# Patient Record
Sex: MALE | Race: WHITE | NOT HISPANIC OR LATINO | URBAN - NONMETROPOLITAN AREA
[De-identification: names, ages, dates, MRNs, and addresses within clinical notes are randomized per-mention and may not be internally consistent; named-entity substitution may affect disease eponyms.]

---
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
## 2023-03-20 ENCOUNTER — HOSPITAL ENCOUNTER (OUTPATIENT)
Dept: GENERAL RADIOLOGY | Facility: HOSPITAL | Age: 18
Discharge: HOME | End: 2023-03-20
Payer: COMMERCIAL

## 2023-03-20 ENCOUNTER — TRANSCRIBE ORDERS (OUTPATIENT)
Dept: ADMINISTRATIVE | Facility: HOSPITAL | Age: 18
End: 2023-03-20
Payer: COMMERCIAL

## 2023-03-20 DIAGNOSIS — M54.50 LOW BACK PAIN, UNSPECIFIED BACK PAIN LATERALITY, UNSPECIFIED CHRONICITY, UNSPECIFIED WHETHER SCIATICA PRESENT: Primary | ICD-10-CM

## 2023-03-20 DIAGNOSIS — M54.50 LOW BACK PAIN, UNSPECIFIED BACK PAIN LATERALITY, UNSPECIFIED CHRONICITY, UNSPECIFIED WHETHER SCIATICA PRESENT: ICD-10-CM

## 2023-03-20 PROCEDURE — 72110 X-RAY EXAM L-2 SPINE 4/>VWS: CPT | Performed by: RADIOLOGY

## 2023-03-20 PROCEDURE — 72110 X-RAY EXAM L-2 SPINE 4/>VWS: CPT

## 2024-04-14 ENCOUNTER — APPOINTMENT (OUTPATIENT)
Dept: CT IMAGING | Facility: HOSPITAL | Age: 19
End: 2024-04-14
Payer: COMMERCIAL

## 2024-04-14 ENCOUNTER — HOSPITAL ENCOUNTER (EMERGENCY)
Facility: HOSPITAL | Age: 19
Discharge: HOME | End: 2024-04-14
Attending: STUDENT IN AN ORGANIZED HEALTH CARE EDUCATION/TRAINING PROGRAM | Admitting: STUDENT IN AN ORGANIZED HEALTH CARE EDUCATION/TRAINING PROGRAM
Payer: COMMERCIAL

## 2024-04-14 VITALS
OXYGEN SATURATION: 98 % | SYSTOLIC BLOOD PRESSURE: 133 MMHG | WEIGHT: 260 LBS | RESPIRATION RATE: 14 BRPM | BODY MASS INDEX: 39.4 KG/M2 | DIASTOLIC BLOOD PRESSURE: 67 MMHG | HEART RATE: 71 BPM | HEIGHT: 68 IN | TEMPERATURE: 98.6 F

## 2024-04-14 DIAGNOSIS — N30.01 ACUTE CYSTITIS WITH HEMATURIA: Primary | ICD-10-CM

## 2024-04-14 LAB
ALBUMIN SERPL-MCNC: 4.5 G/DL (ref 3.5–5.2)
ALBUMIN/GLOB SERPL: 1.6 G/DL
ALP SERPL-CCNC: 60 U/L (ref 39–117)
ALT SERPL W P-5'-P-CCNC: 19 U/L (ref 1–41)
ANION GAP SERPL CALCULATED.3IONS-SCNC: 11.6 MMOL/L (ref 5–15)
APTT PPP: 27.3 SECONDS (ref 26.5–34.5)
AST SERPL-CCNC: 23 U/L (ref 1–40)
BACTERIA UR QL AUTO: ABNORMAL /HPF
BASOPHILS # BLD AUTO: 0.03 10*3/MM3 (ref 0–0.2)
BASOPHILS NFR BLD AUTO: 0.3 % (ref 0–1.5)
BILIRUB SERPL-MCNC: 0.4 MG/DL (ref 0–1.2)
BILIRUB UR QL STRIP: NEGATIVE
BUN SERPL-MCNC: 14 MG/DL (ref 6–20)
BUN/CREAT SERPL: 14.9 (ref 7–25)
C TRACH RRNA CVX QL NAA+PROBE: NOT DETECTED
CALCIUM SPEC-SCNC: 9.4 MG/DL (ref 8.6–10.5)
CHLORIDE SERPL-SCNC: 106 MMOL/L (ref 98–107)
CLARITY UR: ABNORMAL
CO2 SERPL-SCNC: 23.4 MMOL/L (ref 22–29)
COLOR UR: YELLOW
CREAT SERPL-MCNC: 0.94 MG/DL (ref 0.76–1.27)
CRP SERPL-MCNC: 1.33 MG/DL (ref 0–0.5)
DEPRECATED RDW RBC AUTO: 41.2 FL (ref 37–54)
EGFRCR SERPLBLD CKD-EPI 2021: 118.3 ML/MIN/1.73
EOSINOPHIL # BLD AUTO: 0.07 10*3/MM3 (ref 0–0.4)
EOSINOPHIL NFR BLD AUTO: 0.6 % (ref 0.3–6.2)
ERYTHROCYTE [DISTWIDTH] IN BLOOD BY AUTOMATED COUNT: 13.2 % (ref 12.3–15.4)
GLOBULIN UR ELPH-MCNC: 2.9 GM/DL
GLUCOSE SERPL-MCNC: 105 MG/DL (ref 65–99)
GLUCOSE UR STRIP-MCNC: NEGATIVE MG/DL
HCT VFR BLD AUTO: 44 % (ref 37.5–51)
HGB BLD-MCNC: 14.4 G/DL (ref 13–17.7)
HGB UR QL STRIP.AUTO: ABNORMAL
HOLD SPECIMEN: NORMAL
HOLD SPECIMEN: NORMAL
HYALINE CASTS UR QL AUTO: ABNORMAL /LPF
IMM GRANULOCYTES # BLD AUTO: 0.04 10*3/MM3 (ref 0–0.05)
IMM GRANULOCYTES NFR BLD AUTO: 0.3 % (ref 0–0.5)
INR PPP: 0.93 (ref 0.9–1.1)
KETONES UR QL STRIP: NEGATIVE
LEUKOCYTE ESTERASE UR QL STRIP.AUTO: ABNORMAL
LYMPHOCYTES # BLD AUTO: 2.11 10*3/MM3 (ref 0.7–3.1)
LYMPHOCYTES NFR BLD AUTO: 17.7 % (ref 19.6–45.3)
MAGNESIUM SERPL-MCNC: 2 MG/DL (ref 1.6–2.6)
MCH RBC QN AUTO: 28.2 PG (ref 26.6–33)
MCHC RBC AUTO-ENTMCNC: 32.7 G/DL (ref 31.5–35.7)
MCV RBC AUTO: 86.1 FL (ref 79–97)
MONOCYTES # BLD AUTO: 1.07 10*3/MM3 (ref 0.1–0.9)
MONOCYTES NFR BLD AUTO: 9 % (ref 5–12)
N GONORRHOEA RRNA SPEC QL NAA+PROBE: NOT DETECTED
NEUTROPHILS NFR BLD AUTO: 72.1 % (ref 42.7–76)
NEUTROPHILS NFR BLD AUTO: 8.6 10*3/MM3 (ref 1.7–7)
NITRITE UR QL STRIP: NEGATIVE
NRBC BLD AUTO-RTO: 0 /100 WBC (ref 0–0.2)
PH UR STRIP.AUTO: 6 [PH] (ref 5–8)
PLATELET # BLD AUTO: 275 10*3/MM3 (ref 140–450)
PMV BLD AUTO: 10.4 FL (ref 6–12)
POTASSIUM SERPL-SCNC: 4.7 MMOL/L (ref 3.5–5.2)
PROT SERPL-MCNC: 7.4 G/DL (ref 6–8.5)
PROT UR QL STRIP: ABNORMAL
PROTHROMBIN TIME: 13 SECONDS (ref 12.1–14.7)
RBC # BLD AUTO: 5.11 10*6/MM3 (ref 4.14–5.8)
RBC # UR STRIP: ABNORMAL /HPF
REF LAB TEST METHOD: ABNORMAL
SODIUM SERPL-SCNC: 141 MMOL/L (ref 136–145)
SP GR UR STRIP: 1.02 (ref 1–1.03)
SQUAMOUS #/AREA URNS HPF: ABNORMAL /HPF
TRICHOMONAS VAGINALIS PCR: NOT DETECTED
UROBILINOGEN UR QL STRIP: ABNORMAL
WBC # UR STRIP: ABNORMAL /HPF
WBC NRBC COR # BLD AUTO: 11.92 10*3/MM3 (ref 3.4–10.8)
WHOLE BLOOD HOLD COAG: NORMAL
WHOLE BLOOD HOLD SPECIMEN: NORMAL

## 2024-04-14 PROCEDURE — 74178 CT ABD&PLV WO CNTR FLWD CNTR: CPT

## 2024-04-14 PROCEDURE — 96365 THER/PROPH/DIAG IV INF INIT: CPT

## 2024-04-14 PROCEDURE — 85610 PROTHROMBIN TIME: CPT | Performed by: PHYSICIAN ASSISTANT

## 2024-04-14 PROCEDURE — 87086 URINE CULTURE/COLONY COUNT: CPT | Performed by: PHYSICIAN ASSISTANT

## 2024-04-14 PROCEDURE — 85730 THROMBOPLASTIN TIME PARTIAL: CPT | Performed by: PHYSICIAN ASSISTANT

## 2024-04-14 PROCEDURE — 85025 COMPLETE CBC W/AUTO DIFF WBC: CPT | Performed by: PHYSICIAN ASSISTANT

## 2024-04-14 PROCEDURE — 83735 ASSAY OF MAGNESIUM: CPT | Performed by: PHYSICIAN ASSISTANT

## 2024-04-14 PROCEDURE — 87491 CHLMYD TRACH DNA AMP PROBE: CPT | Performed by: PHYSICIAN ASSISTANT

## 2024-04-14 PROCEDURE — 25010000002 CEFTRIAXONE PER 250 MG: Performed by: PHYSICIAN ASSISTANT

## 2024-04-14 PROCEDURE — 25810000003 SODIUM CHLORIDE 0.9 % SOLUTION: Performed by: PHYSICIAN ASSISTANT

## 2024-04-14 PROCEDURE — 87591 N.GONORRHOEAE DNA AMP PROB: CPT | Performed by: PHYSICIAN ASSISTANT

## 2024-04-14 PROCEDURE — 86140 C-REACTIVE PROTEIN: CPT | Performed by: PHYSICIAN ASSISTANT

## 2024-04-14 PROCEDURE — 87661 TRICHOMONAS VAGINALIS AMPLIF: CPT | Performed by: PHYSICIAN ASSISTANT

## 2024-04-14 PROCEDURE — 81001 URINALYSIS AUTO W/SCOPE: CPT | Performed by: PHYSICIAN ASSISTANT

## 2024-04-14 PROCEDURE — 99285 EMERGENCY DEPT VISIT HI MDM: CPT

## 2024-04-14 PROCEDURE — 80053 COMPREHEN METABOLIC PANEL: CPT | Performed by: PHYSICIAN ASSISTANT

## 2024-04-14 PROCEDURE — 25510000001 IOPAMIDOL 61 % SOLUTION: Performed by: STUDENT IN AN ORGANIZED HEALTH CARE EDUCATION/TRAINING PROGRAM

## 2024-04-14 PROCEDURE — 74178 CT ABD&PLV WO CNTR FLWD CNTR: CPT | Performed by: RADIOLOGY

## 2024-04-14 RX ORDER — SODIUM CHLORIDE 0.9 % (FLUSH) 0.9 %
10 SYRINGE (ML) INJECTION AS NEEDED
Status: DISCONTINUED | OUTPATIENT
Start: 2024-04-14 | End: 2024-04-14 | Stop reason: HOSPADM

## 2024-04-14 RX ORDER — CEFDINIR 300 MG/1
300 CAPSULE ORAL 2 TIMES DAILY
Qty: 14 CAPSULE | Refills: 0 | Status: SHIPPED | OUTPATIENT
Start: 2024-04-14 | End: 2024-04-21

## 2024-04-14 RX ORDER — PHENAZOPYRIDINE HYDROCHLORIDE 200 MG/1
200 TABLET, FILM COATED ORAL 3 TIMES DAILY PRN
Qty: 6 TABLET | Refills: 0 | Status: SHIPPED | OUTPATIENT
Start: 2024-04-14

## 2024-04-14 RX ADMIN — SODIUM CHLORIDE 1000 ML: 9 INJECTION, SOLUTION INTRAVENOUS at 09:33

## 2024-04-14 RX ADMIN — SODIUM CHLORIDE 2000 MG: 9 INJECTION, SOLUTION INTRAVENOUS at 11:15

## 2024-04-14 RX ADMIN — IOPAMIDOL 80 ML: 612 INJECTION, SOLUTION INTRAVENOUS at 10:07

## 2024-04-14 NOTE — ED PROVIDER NOTES
Subjective   History of Present Illness  19-year-old male who presents to the ED today with his mother for hematuria.  He states he developed gross hematuria yesterday.  He is also had increased urinary frequency and urgency.  He denies any dysuria but states he has a lot of pressure and bladder spasm when he tries to urinate.  He denies any known fever but does report chills.  He denies any abdominal pain or back pain.  He states he has never been sexually active.  He denies any urethral discharge.  He reports being healthy and states that he does not smoke or vape.  He is not currently on any daily medications.    History provided by:  Patient  Blood in Urine  This is a new problem. The current episode started yesterday. The problem is unchanged. He describes the hematuria as gross hematuria. He is experiencing no pain. He describes his urine color as dark red. Irritative symptoms include frequency and urgency. Associated symptoms include chills. Pertinent negatives include no abdominal pain, bladder pain, dysuria, facial swelling, fever, flank pain, genital pain, hesitancy, inability to urinate, nausea, urinary retention, vomiting or weight loss. He is not sexually active.       Review of Systems   Constitutional:  Positive for chills. Negative for fever and weight loss.   HENT: Negative.  Negative for facial swelling.    Eyes: Negative.    Respiratory: Negative.     Cardiovascular: Negative.    Gastrointestinal: Negative.  Negative for abdominal pain, nausea and vomiting.   Genitourinary:  Positive for frequency, hematuria and urgency. Negative for dysuria, flank pain and hesitancy.   Musculoskeletal: Negative.    Skin: Negative.    Neurological: Negative.    Psychiatric/Behavioral: Negative.     All other systems reviewed and are negative.      No past medical history on file.    No Known Allergies    No past surgical history on file.    Family History   Problem Relation Age of Onset    Diabetes Mother      Hypertension Mother     Diabetes Maternal Grandfather     Hypertension Maternal Grandfather        Social History     Socioeconomic History    Marital status: Single   Tobacco Use    Smoking status: Never    Smokeless tobacco: Never   Substance and Sexual Activity    Alcohol use: No    Drug use: No    Sexual activity: Defer           Objective   Physical Exam  Vitals and nursing note reviewed.   Constitutional:       General: He is not in acute distress.     Appearance: Normal appearance. He is obese. He is not diaphoretic.   HENT:      Head: Normocephalic and atraumatic.      Right Ear: External ear normal.      Left Ear: External ear normal.      Nose: Nose normal.   Eyes:      Conjunctiva/sclera: Conjunctivae normal.      Pupils: Pupils are equal, round, and reactive to light.   Cardiovascular:      Rate and Rhythm: Normal rate and regular rhythm.      Heart sounds: Normal heart sounds.   Pulmonary:      Effort: Pulmonary effort is normal.      Breath sounds: Normal breath sounds.   Abdominal:      General: Bowel sounds are normal.      Palpations: Abdomen is soft.      Tenderness: There is no abdominal tenderness. There is no right CVA tenderness, left CVA tenderness, guarding or rebound.   Musculoskeletal:         General: Normal range of motion.      Cervical back: Normal range of motion and neck supple.   Skin:     General: Skin is warm and dry.      Capillary Refill: Capillary refill takes less than 2 seconds.   Neurological:      General: No focal deficit present.      Mental Status: He is alert and oriented to person, place, and time.   Psychiatric:         Mood and Affect: Mood normal.         Procedures       Results for orders placed or performed during the hospital encounter of 04/14/24   Chlamydia trachomatis, Neisseria gonorrhoeae, Trichomonas vaginalis, PCR - Urine, Urine, Clean Catch    Specimen: Urine, Clean Catch   Result Value Ref Range    Chlamydia DNA by PCR Not Detected Not Detected     Neisseria gonorrhoeae by PCR Not Detected Not Detected    Trichomonas vaginalis PCR Not Detected Not Detected, Invalid   Urinalysis With Culture If Indicated - Urine, Clean Catch    Specimen: Urine, Clean Catch   Result Value Ref Range    Color, UA Yellow Yellow, Straw    Appearance, UA Cloudy (A) Clear    pH, UA 6.0 5.0 - 8.0    Specific Gravity, UA 1.023 1.005 - 1.030    Glucose, UA Negative Negative    Ketones, UA Negative Negative    Bilirubin, UA Negative Negative    Blood, UA Large (3+) (A) Negative    Protein, UA >=300 mg/dL (3+) (A) Negative    Leuk Esterase, UA Moderate (2+) (A) Negative    Nitrite, UA Negative Negative    Urobilinogen, UA 1.0 E.U./dL 0.2 - 1.0 E.U./dL   Comprehensive Metabolic Panel    Specimen: Blood   Result Value Ref Range    Glucose 105 (H) 65 - 99 mg/dL    BUN 14 6 - 20 mg/dL    Creatinine 0.94 0.76 - 1.27 mg/dL    Sodium 141 136 - 145 mmol/L    Potassium 4.7 3.5 - 5.2 mmol/L    Chloride 106 98 - 107 mmol/L    CO2 23.4 22.0 - 29.0 mmol/L    Calcium 9.4 8.6 - 10.5 mg/dL    Total Protein 7.4 6.0 - 8.5 g/dL    Albumin 4.5 3.5 - 5.2 g/dL    ALT (SGPT) 19 1 - 41 U/L    AST (SGOT) 23 1 - 40 U/L    Alkaline Phosphatase 60 39 - 117 U/L    Total Bilirubin 0.4 0.0 - 1.2 mg/dL    Globulin 2.9 gm/dL    A/G Ratio 1.6 g/dL    BUN/Creatinine Ratio 14.9 7.0 - 25.0    Anion Gap 11.6 5.0 - 15.0 mmol/L    eGFR 118.3 >60.0 mL/min/1.73   Protime-INR    Specimen: Blood   Result Value Ref Range    Protime 13.0 12.1 - 14.7 Seconds    INR 0.93 0.90 - 1.10   aPTT    Specimen: Blood   Result Value Ref Range    PTT 27.3 26.5 - 34.5 seconds   C-reactive Protein    Specimen: Blood   Result Value Ref Range    C-Reactive Protein 1.33 (H) 0.00 - 0.50 mg/dL   Magnesium    Specimen: Blood   Result Value Ref Range    Magnesium 2.0 1.6 - 2.6 mg/dL   CBC Auto Differential    Specimen: Blood   Result Value Ref Range    WBC 11.92 (H) 3.40 - 10.80 10*3/mm3    RBC 5.11 4.14 - 5.80 10*6/mm3    Hemoglobin 14.4 13.0 - 17.7 g/dL     Hematocrit 44.0 37.5 - 51.0 %    MCV 86.1 79.0 - 97.0 fL    MCH 28.2 26.6 - 33.0 pg    MCHC 32.7 31.5 - 35.7 g/dL    RDW 13.2 12.3 - 15.4 %    RDW-SD 41.2 37.0 - 54.0 fl    MPV 10.4 6.0 - 12.0 fL    Platelets 275 140 - 450 10*3/mm3    Neutrophil % 72.1 42.7 - 76.0 %    Lymphocyte % 17.7 (L) 19.6 - 45.3 %    Monocyte % 9.0 5.0 - 12.0 %    Eosinophil % 0.6 0.3 - 6.2 %    Basophil % 0.3 0.0 - 1.5 %    Immature Grans % 0.3 0.0 - 0.5 %    Neutrophils, Absolute 8.60 (H) 1.70 - 7.00 10*3/mm3    Lymphocytes, Absolute 2.11 0.70 - 3.10 10*3/mm3    Monocytes, Absolute 1.07 (H) 0.10 - 0.90 10*3/mm3    Eosinophils, Absolute 0.07 0.00 - 0.40 10*3/mm3    Basophils, Absolute 0.03 0.00 - 0.20 10*3/mm3    Immature Grans, Absolute 0.04 0.00 - 0.05 10*3/mm3    nRBC 0.0 0.0 - 0.2 /100 WBC   Urinalysis, Microscopic Only - Urine, Clean Catch    Specimen: Urine, Clean Catch   Result Value Ref Range    RBC, UA Too Numerous to Count (A) None Seen, 0-2 /HPF    WBC, UA Too Numerous to Count (A) None Seen, 0-2 /HPF    Bacteria, UA None Seen None Seen /HPF    Squamous Epithelial Cells, UA None Seen None Seen, 0-2 /HPF    Hyaline Casts, UA None Seen None Seen /LPF    Methodology Automated Microscopy    Green Top (Gel)   Result Value Ref Range    Extra Tube Hold for add-ons.    Lavender Top   Result Value Ref Range    Extra Tube hold for add-on    Gold Top - SST   Result Value Ref Range    Extra Tube Hold for add-ons.    Light Blue Top   Result Value Ref Range    Extra Tube Hold for add-ons.           ED Course  ED Course as of 04/14/24 1143   Sun Apr 14, 2024   1051 Awaiting reading on CT scan []   1108 CT Abdomen Pelvis With & Without Contrast  IMPRESSION:     1. Abnormal thickening of the urinary bladder wall. Air within the  urinary bladder. Stranding around the urinary bladder.     2. Other findings as above   []      ED Course User Index  [] Jessica Ridley, PA                                             Medical Decision  Making  19-year-old male who presents to the ED today for gross hematuria that started yesterday.  He has urinary urgency and frequency.  He feels a lot of pressure when he has to urinate.  No evidence of urinary retention today.  Urinalysis did show too numerous to count red and white blood cells, no bacteria.  Urine has been sent off for culture.  Kidney function was within normal limits.  White blood cell count was 12,000.  CT of the abdomen and pelvis shows abnormal thickening of the urinary bladder wall and air within the urinary bladder with stranding around the urinary bladder.  He was given IV Rocephin in the ED.  He will be discharged on cefdinir and Pyridium.  He was advised to call the urology clinic tomorrow to schedule the next available appointment for follow-up.  He will return to the ED if symptoms change or worsen.    Problems Addressed:  Acute cystitis with hematuria: complicated acute illness or injury    Amount and/or Complexity of Data Reviewed  Labs: ordered.  Radiology: ordered. Decision-making details documented in ED Course.    Risk  Prescription drug management.        Final diagnoses:   Acute cystitis with hematuria       ED Disposition  ED Disposition       ED Disposition   Discharge    Condition   Stable    Comment   --               Kb Gay MD  [address]  [place] [address]  [phone]    Call in 1 day           Medication List        New Prescriptions      cefdinir 300 MG capsule  Commonly known as: OMNICEF  Take 1 capsule by mouth 2 (Two) Times a Day for 7 days.     phenazopyridine 200 MG tablet  Commonly known as: PYRIDIUM  Take 1 tablet by mouth 3 (Three) Times a Day As Needed for Bladder Spasms.               Where to Get Your Medications        These medications were sent to [id number] - [place] - [address] - [phone]  - [phone] FX  [address] [place] [address]      Phone:  [phone]   cefdinir 300 MG capsule  phenazopyridine 200 MG tablet            Jessica Ridley, PA  04/14/24 2718

## 2024-04-14 NOTE — DISCHARGE INSTRUCTIONS
Call the urology clinic tomorrow to schedule the next available appointment for follow-up.  A prescription for an antibiotic and Pyridium, which is to help with your bladder pain has been sent to your pharmacy.  You can start the Pyridium today.  Start the antibiotic tomorrow.  Make sure you are drinking plenty of fluids.  Return to the ED at any time if symptoms change or worsen.

## 2024-04-14 NOTE — Clinical Note
[facility] EMERGENCY DEPARTMENT  [address] [address]  Phone: [phone]    Elie Vazquez was seen and treated in our emergency department on 4/14/2024.  He may return to work on 04/16/2024.         Thank you for choosing [facility].    Jessica Ridley, PA

## 2024-04-15 LAB — BACTERIA SPEC AEROBE CULT: NO GROWTH

## 2024-04-22 ENCOUNTER — OFFICE VISIT (OUTPATIENT)
Dept: UROLOGY | Facility: CLINIC | Age: 19
End: 2024-04-22
Payer: COMMERCIAL

## 2024-04-22 VITALS
BODY MASS INDEX: 40.62 KG/M2 | HEART RATE: 89 BPM | HEIGHT: 68 IN | SYSTOLIC BLOOD PRESSURE: 136 MMHG | WEIGHT: 268 LBS | DIASTOLIC BLOOD PRESSURE: 87 MMHG

## 2024-04-22 DIAGNOSIS — R35.0 FREQUENCY OF MICTURITION: ICD-10-CM

## 2024-04-22 DIAGNOSIS — N30.91 CYSTITIS WITH HEMATURIA: Primary | ICD-10-CM

## 2024-04-22 LAB
BILIRUB BLD-MCNC: NEGATIVE MG/DL
CLARITY, POC: CLEAR
COLOR UR: YELLOW
EXPIRATION DATE: ABNORMAL
GLUCOSE UR STRIP-MCNC: NEGATIVE MG/DL
KETONES UR QL: NEGATIVE
LEUKOCYTE EST, POC: ABNORMAL
Lab: ABNORMAL
NITRITE UR-MCNC: NEGATIVE MG/ML
PH UR: 5 [PH] (ref 5–8)
PROT UR STRIP-MCNC: ABNORMAL MG/DL
RBC # UR STRIP: ABNORMAL /UL
SP GR UR: 1.03 (ref 1–1.03)
UROBILINOGEN UR QL: NORMAL

## 2024-04-22 PROCEDURE — 99203 OFFICE O/P NEW LOW 30 MIN: CPT

## 2024-04-22 PROCEDURE — 96372 THER/PROPH/DIAG INJ SC/IM: CPT

## 2024-04-22 PROCEDURE — 87086 URINE CULTURE/COLONY COUNT: CPT

## 2024-04-22 PROCEDURE — 81003 URINALYSIS AUTO W/O SCOPE: CPT

## 2024-04-22 RX ORDER — GENTAMICIN 40 MG/ML
80 INJECTION, SOLUTION INTRAMUSCULAR; INTRAVENOUS ONCE
Status: COMPLETED | OUTPATIENT
Start: 2024-04-22 | End: 2024-04-22

## 2024-04-22 RX ADMIN — GENTAMICIN 80 MG: 40 INJECTION, SOLUTION INTRAMUSCULAR; INTRAVENOUS at 09:23

## 2024-04-22 NOTE — PROGRESS NOTES
"Chief Complaint:    Chief Complaint   Patient presents with    Acute cystitis        Vital Signs:   /87   Pulse 89   Ht 172.7 cm (67.99\")   Wt 122 kg (268 lb)   BMI 40.76 kg/m²   Body mass index is 40.76 kg/m².      HPI:  Elie Vazquez is a 19 y.o. male who presents today for initial evaluation     History of Present Illness  Mr. Vazquez presents to the clinic for evaluation of acute cystitis with hematuria.  He has been referred to us by Dr. Rico Stallworth MD.  Patient states that he has had intermittent gross hematuria with passage of blood clots and fleshy like material over the past 10 to 14 days.  States he went to the emergency department on 4/14/2024 for evaluation.  He had a urine analysis completed at that time that showed 2+ leukocytes, 3+ protein, and 3+ blood.  Microscopic UA showed too numerous to count red blood cells and too numerous to count white blood cells.  No bacteria was seen on microscopic UA.  Patient's urine was sent off for culture at that time that showed no growth.  Patient's white blood cell count was slightly elevated at 11.9.  Patient's overall creatinine was great at 0.9 and GFR was 118.  Did have a CT scan of the abdomen and pelvis that showed abnormal thickening of the urinary bladder wall with air within the urinary bladder.  There was notable stranding around the urinary bladder but no significant signs of hydroureter, hydronephrosis, bladder mass, nephrolithiasis, or obvious fistula.  Patient denies any history of urological or urogenital procedures.  He denies any recent catheterizations as well.  He reports that he did pass blood in his urine at roughly 11 PM last night.  Urine in office today is yellow in color with no obvious signs of gross hematuria.  Urine analysis showed 2+ leukocytes, 2+ protein, and 3+ blood.  He denies any fever, chills, nausea, vomiting, back/flank pain, or inability urinate.  He does endorse some frequency, urgency, split stream, and difficulty " starting.  Physical exam today is unremarkable      Past Medical History:  No past medical history on file.    Current Meds:  Current Outpatient Medications   Medication Sig Dispense Refill    phenazopyridine (PYRIDIUM) 200 MG tablet Take 1 tablet by mouth 3 (Three) Times a Day As Needed for Bladder Spasms. (Patient not taking: Reported on 4/22/2024) 6 tablet 0     No current facility-administered medications for this visit.        Allergies:   No Known Allergies     Past Surgical History:  No past surgical history on file.    Social History:  Social History     Socioeconomic History    Marital status: Single   Tobacco Use    Smoking status: Never    Smokeless tobacco: Never   Vaping Use    Vaping status: Never Used   Substance and Sexual Activity    Alcohol use: No    Drug use: No    Sexual activity: Defer       Family History:  Family History   Problem Relation Age of Onset    Diabetes Mother     Hypertension Mother     Diabetes Maternal Grandfather     Hypertension Maternal Grandfather        Review of Systems:  Review of Systems   Constitutional:  Negative for fatigue, fever and unexpected weight change.   Respiratory:  Negative for chest tightness and shortness of breath.    Cardiovascular:  Negative for chest pain.   Gastrointestinal:  Negative for abdominal pain, constipation, diarrhea, nausea and vomiting.   Genitourinary:  Positive for difficulty urinating, dysuria, frequency, hematuria and urgency. Negative for flank pain, penile pain, penile swelling, scrotal swelling and testicular pain.   Skin:  Negative for rash.   Psychiatric/Behavioral:  Negative for confusion and suicidal ideas.        Physical Exam:  Physical Exam  Constitutional:       General: He is not in acute distress.     Appearance: Normal appearance.   HENT:      Head: Normocephalic and atraumatic.      Nose: Nose normal.      Mouth/Throat:      Mouth: Mucous membranes are moist.   Eyes:      Conjunctiva/sclera: Conjunctivae normal.    Cardiovascular:      Rate and Rhythm: Normal rate and regular rhythm.      Pulses: Normal pulses.      Heart sounds: Normal heart sounds.   Pulmonary:      Effort: Pulmonary effort is normal.      Breath sounds: Normal breath sounds.   Abdominal:      General: Bowel sounds are normal.      Palpations: Abdomen is soft.   Genitourinary:     Penis: Normal.       Testes: Normal.   Musculoskeletal:         General: Normal range of motion.      Cervical back: Normal range of motion.   Skin:     General: Skin is warm.   Neurological:      General: No focal deficit present.      Mental Status: He is alert and oriented to person, place, and time.   Psychiatric:         Mood and Affect: Mood normal.         Behavior: Behavior normal.         Thought Content: Thought content normal.         Judgment: Judgment normal.             Recent Image (CT and/or KUB):   CT Abdomen and Pelvis: Results for orders placed during the hospital encounter of 04/14/24    CT Abdomen Pelvis With & Without Contrast    Narrative  EXAM: CT ABDOMEN PELVIS W WO CONTRAST-      TECHNIQUE: Multiple axial CT images were obtained from lung bases  through pubic symphysis WITHOUT AND THEN AFTER THE administration of IV  contrast. Reformatted images in the coronal and/or sagittal plane(s)  were generated from the axial data set to facilitate diagnostic accuracy  and/or surgical planning.  Oral Contrast:NONE.    Radiation dose reduction techniques were utilized per ALARA protocol.  Automated exposure control was initiated through either or [employer] or  DoseRigPar8o software packages by  protocol.  DOSE:    Clinical information acute hematuria    Comparison 3/28/2021    FINDINGS:    Lower thorax: Clear. No effusions.    Abdomen:    Liver: Homogeneous. No focal hepatic mass or ductal dilatation.    Gallbladder: No dilation or stone identified.    Pancreas: Unremarkable. No mass or ductal dilatation.    Spleen: Homogeneous. No splenomegaly.    Adrenals:  No mass.    Kidneys/ureters: No mass. No obstructive uropathy.  No evidence of  urolithiasis.    GI tract: Moderate to large stool burden. There is no evidence of  appendicitis    MESENTERY: Stranding around the urinary bladder    Vasculature: No evidence of aneurysm.    Abdominal wall: No focal hernia or mass.      Bladder: Urinary bladder wall is thickened. There is air in the urinary  bladder and perivesicular stranding.    Reproductive: Unremarkable as visualized    Bones: No acute bony abnormality.    Impression  1. Abnormal thickening of the urinary bladder wall. Air within the  urinary bladder. Stranding around the urinary bladder.    2. Other findings as above              r    This report was finalized on 4/14/2024 11:05 AM by Dr. Ahmet Tao MD.     CT Stone Protocol: No results found for this or any previous visit.     KUB: No results found for this or any previous visit.       Labs:  Brief Urine Lab Results  (Last result in the past 365 days)        Color   Clarity   Blood   Leuk Est   Nitrite   Protein   CREAT   Urine HCG        04/22/24 0902 Yellow   Clear   3+   Moderate (2+)   Negative   2+                 Office Visit on 04/22/2024   Component Date Value Ref Range Status    Color 04/22/2024 Yellow  Yellow, Straw, Dark Yellow, Sharon Final    Clarity, UA 04/22/2024 Clear  Clear Final    Specific Gravity  04/22/2024 1.030  1.005 - 1.030 Final    pH, Urine 04/22/2024 5.0  5.0 - 8.0 Final    Leukocytes 04/22/2024 Moderate (2+) (A)  Negative Final    Nitrite, UA 04/22/2024 Negative  Negative Final    Protein, POC 04/22/2024 2+ (A)  Negative mg/dL Final    Glucose, UA 04/22/2024 Negative  Negative mg/dL Final    Ketones, UA 04/22/2024 Negative  Negative Final    Urobilinogen, UA 04/22/2024 Normal  Normal, 0.2 E.U./dL Final    Bilirubin 04/22/2024 Negative  Negative Final    Blood, UA 04/22/2024 3+ (A)  Negative Final    Lot Number 04/22/2024 [id number]   Final    Expiration Date 04/22/2024 10/25/2024    Final   Admission on 04/14/2024, Discharged on 04/14/2024   Component Date Value Ref Range Status    Color, UA 04/14/2024 Yellow  Yellow, Straw Final    Appearance, UA 04/14/2024 Cloudy (A)  Clear Final    pH, UA 04/14/2024 6.0  5.0 - 8.0 Final    Specific Gravity, UA 04/14/2024 1.023  1.005 - 1.030 Final    Glucose, UA 04/14/2024 Negative  Negative Final    Ketones, UA 04/14/2024 Negative  Negative Final    Bilirubin, UA 04/14/2024 Negative  Negative Final    Blood, UA 04/14/2024 Large (3+) (A)  Negative Final    Protein, UA 04/14/2024 >=300 mg/dL (3+) (A)  Negative Final    Leuk Esterase, UA 04/14/2024 Moderate (2+) (A)  Negative Final    Nitrite, UA 04/14/2024 Negative  Negative Final    Urobilinogen, UA 04/14/2024 1.0 E.U./dL  0.2 - 1.0 E.U./dL Final    Chlamydia DNA by PCR 04/14/2024 Not Detected  Not Detected Final    Neisseria gonorrhoeae by PCR 04/14/2024 Not Detected  Not Detected Final    Trichomonas vaginalis PCR 04/14/2024 Not Detected  Not Detected, Invalid Final    Glucose 04/14/2024 105 (H)  65 - 99 mg/dL Final    BUN 04/14/2024 14  6 - 20 mg/dL Final    Creatinine 04/14/2024 0.94  0.76 - 1.27 mg/dL Final    Sodium 04/14/2024 141  136 - 145 mmol/L Final    Potassium 04/14/2024 4.7  3.5 - 5.2 mmol/L Final    Specimen hemolyzed.  Result may be falsely elevated.    Chloride 04/14/2024 106  98 - 107 mmol/L Final    CO2 04/14/2024 23.4  22.0 - 29.0 mmol/L Final    Calcium 04/14/2024 9.4  8.6 - 10.5 mg/dL Final    Total Protein 04/14/2024 7.4  6.0 - 8.5 g/dL Final    Albumin 04/14/2024 4.5  3.5 - 5.2 g/dL Final    ALT (SGPT) 04/14/2024 19  1 - 41 U/L Final    Specimen hemolyzed.  Result may  be falsely elevated.    AST (SGOT) 04/14/2024 23  1 - 40 U/L Final    Alkaline Phosphatase 04/14/2024 60  39 - 117 U/L Final    Total Bilirubin 04/14/2024 0.4  0.0 - 1.2 mg/dL Final    Globulin 04/14/2024 2.9  gm/dL Final    A/G Ratio 04/14/2024 1.6  g/dL Final    BUN/Creatinine Ratio 04/14/2024 14.9  7.0 - 25.0 Final     Anion Gap 04/14/2024 11.6  5.0 - 15.0 mmol/L Final    eGFR 04/14/2024 118.3  >60.0 mL/min/1.73 Final    Protime 04/14/2024 13.0  12.1 - 14.7 Seconds Final    INR 04/14/2024 0.93  0.90 - 1.10 Final    PTT 04/14/2024 27.3  26.5 - 34.5 seconds Final    C-Reactive Protein 04/14/2024 1.33 (H)  0.00 - 0.50 mg/dL Final    Magnesium 04/14/2024 2.0  1.6 - 2.6 mg/dL Final    WBC 04/14/2024 11.92 (H)  3.40 - 10.80 10*3/mm3 Final    RBC 04/14/2024 5.11  4.14 - 5.80 10*6/mm3 Final    Hemoglobin 04/14/2024 14.4  13.0 - 17.7 g/dL Final    Hematocrit 04/14/2024 44.0  37.5 - 51.0 % Final    MCV 04/14/2024 86.1  79.0 - 97.0 fL Final    MCH 04/14/2024 28.2  26.6 - 33.0 pg Final    MCHC 04/14/2024 32.7  31.5 - 35.7 g/dL Final    RDW 04/14/2024 13.2  12.3 - 15.4 % Final    RDW-SD 04/14/2024 41.2  37.0 - 54.0 fl Final    MPV 04/14/2024 10.4  6.0 - 12.0 fL Final    Platelets 04/14/2024 275  140 - 450 10*3/mm3 Final    Neutrophil % 04/14/2024 72.1  42.7 - 76.0 % Final    Lymphocyte % 04/14/2024 17.7 (L)  19.6 - 45.3 % Final    Monocyte % 04/14/2024 9.0  5.0 - 12.0 % Final    Eosinophil % 04/14/2024 0.6  0.3 - 6.2 % Final    Basophil % 04/14/2024 0.3  0.0 - 1.5 % Final    Immature Grans % 04/14/2024 0.3  0.0 - 0.5 % Final    Neutrophils, Absolute 04/14/2024 8.60 (H)  1.70 - 7.00 10*3/mm3 Final    Lymphocytes, Absolute 04/14/2024 2.11  0.70 - 3.10 10*3/mm3 Final    Monocytes, Absolute 04/14/2024 1.07 (H)  0.10 - 0.90 10*3/mm3 Final    Eosinophils, Absolute 04/14/2024 0.07  0.00 - 0.40 10*3/mm3 Final    Basophils, Absolute 04/14/2024 0.03  0.00 - 0.20 10*3/mm3 Final    Immature Grans, Absolute 04/14/2024 0.04  0.00 - 0.05 10*3/mm3 Final    nRBC 04/14/2024 0.0  0.0 - 0.2 /100 WBC Final    Extra Tube 04/14/2024 Hold for add-ons.   Final    Auto resulted.    Extra Tube 04/14/2024 hold for add-on   Final    Auto resulted    Extra Tube 04/14/2024 Hold for add-ons.   Final    Auto resulted.    Extra Tube 04/14/2024 Hold for add-ons.   Final     Auto resulted    RBC, UA 04/14/2024 Too Numerous to Count (A)  None Seen, 0-2 /HPF Final    WBC, UA 04/14/2024 Too Numerous to Count (A)  None Seen, 0-2 /HPF Final    Bacteria, UA 04/14/2024 None Seen  None Seen /HPF Final    Squamous Epithelial Cells, UA 04/14/2024 None Seen  None Seen, 0-2 /HPF Final    Hyaline Casts, UA 04/14/2024 None Seen  None Seen /LPF Final    Methodology 04/14/2024 Automated Microscopy   Final    Urine Culture 04/14/2024 No growth   Final        Procedure: None  Procedures     I have reviewed and agree with the above PMH, PSH, FMH, social history, medications, allergies, and labs.     Assessment/Plan:   Problem List Items Addressed This Visit    None  Visit Diagnoses       Frequency of micturition    -  Primary    Relevant Medications    gentamicin (GARAMYCIN) injection 80 mg (Completed)    Other Relevant Orders    POC Urinalysis Dipstick, Automated (Completed)    Urine Culture - Urine, Urine, Clean Catch            Health Maintenance:   Health Maintenance Due   Topic Date Due    BMI FOLLOWUP  Never done    HPV VACCINES (1 - Male 3-dose series) Never done    HEPATITIS C SCREENING  Never done    ANNUAL PHYSICAL  Never done    TDAP/TD VACCINES (1 - Tdap) Never done    COVID-19 Vaccine (3 - 2023-24 season) 09/01/2023        Smoking Counseling: Never smoked or use smokeless tobacco    Urine Incontinence: Patient reports that he is not currently experiencing any symptoms of urinary incontinence.    Patient was given instructions and counseling regarding his condition or for health maintenance advice. Please see specific information pulled into the AVS if appropriate.    Patient Education:   Cystitis with hematuria and air within bladder -discussed with the patient the pathophysiology of this condition in detail.  I discussed causes which can include but not limited to acute infection, emphysematous cystitis, nephrolithiasis, prostatitis, fistula, trauma, instrumentations, and other urological  abnormalities.  Discussed an appropriate workup including a CT scan with and without contrast as well as a possible cystoscopy urogram.  Discussed both of these procedures in detail.  Patient's most recent CT scan with and without contrast showed no concerns of fistula.  Did discuss a cystoscopy urogram in detail with the patient.  Also discussed the use of an in office cystoscopy to identify any significant source or cause of cystitis and blood clots.  Discussed this procedure in detail including risk and benefits.  Given the patient's urine analysis still shows concerns of acute infection I will give the patient a gentamicin 80 mg shot in office today.  I will also send the patient's urine off for culture and call with results once available.  Patient does wish to proceed forward with an in office cystoscopy soon as possible.  Will get him scheduled this Wednesday.    Visit Diagnoses:    ICD-10-CM ICD-9-CM   1. Frequency of micturition  R35.0 788.41       Meds Ordered During Visit:  New Medications Ordered This Visit   Medications    gentamicin (GARAMYCIN) injection 80 mg       Follow Up Appointment: This Wednesday for an office cystoscopy.  No follow-ups on file.      This document has been electronically signed by Bjorn Anguiano PA-C   April 22, 2024 09:49 EDT    Part of this note may be an electronic transcription/translation of spoken language to printed text using the Dragon Dictation System.

## 2024-04-23 ENCOUNTER — TELEPHONE (OUTPATIENT)
Dept: UROLOGY | Facility: CLINIC | Age: 19
End: 2024-04-23
Payer: COMMERCIAL

## 2024-04-23 DIAGNOSIS — N30.91 CYSTITIS WITH HEMATURIA: Primary | ICD-10-CM

## 2024-04-23 LAB — BACTERIA SPEC AEROBE CULT: NO GROWTH

## 2024-04-23 RX ORDER — TAMSULOSIN HYDROCHLORIDE 0.4 MG/1
1 CAPSULE ORAL DAILY
Qty: 30 CAPSULE | Refills: 0 | Status: SHIPPED | OUTPATIENT
Start: 2024-04-23

## 2024-04-23 NOTE — TELEPHONE ENCOUNTER
Called patient advised him urine culture showed no significan infection.  Patient states gross hematuria continues to improve.  We will have him keep his follow-up tomorrow for an office cystoscopy if needed.

## 2024-04-23 NOTE — TELEPHONE ENCOUNTER
He said that we was suppose to have called in something for him but didn't. He wasn't sure what it was.

## 2024-04-24 ENCOUNTER — PROCEDURE VISIT (OUTPATIENT)
Dept: UROLOGY | Facility: CLINIC | Age: 19
End: 2024-04-24
Payer: COMMERCIAL

## 2024-04-24 VITALS
HEART RATE: 65 BPM | WEIGHT: 262.4 LBS | BODY MASS INDEX: 39.77 KG/M2 | HEIGHT: 68 IN | SYSTOLIC BLOOD PRESSURE: 150 MMHG | DIASTOLIC BLOOD PRESSURE: 79 MMHG

## 2024-04-24 DIAGNOSIS — N30.91 CYSTITIS WITH HEMATURIA: Primary | ICD-10-CM

## 2024-04-24 RX ORDER — GENTAMICIN 40 MG/ML
80 INJECTION, SOLUTION INTRAMUSCULAR; INTRAVENOUS ONCE
Status: COMPLETED | OUTPATIENT
Start: 2024-04-24 | End: 2024-04-24

## 2024-04-24 RX ADMIN — GENTAMICIN 80 MG: 40 INJECTION, SOLUTION INTRAMUSCULAR; INTRAVENOUS at 14:37

## 2024-04-24 NOTE — PROGRESS NOTES
"Chief Complaint:    Chief Complaint   Patient presents with    Cystoscopy    Cystitis with hematuria       Vital Signs:   /79   Pulse 65   Ht 172.7 cm (67.99\")   Wt 119 kg (262 lb 6.4 oz)   BMI 39.91 kg/m²   Body mass index is 39.91 kg/m².      HPI:  Elie Vazquez is a 19 y.o. male who presents today for follow up    History of Present Illness  Mr. Vazquez presents to the clinic for follow-up.  He was initially seen in the emergency department secondary to gross hematuria had a CT scan abdomen pelvis that showed air within the bladder as well as bladder wall thickening.  He did receive a shot of gentamicin at last office visit and his urine was sent off for culture however urine culture showed no growth.  States that since receiving antibiotic shot he has had some improvement in symptoms.  He denies any significant gross hematuria over the past 24 hours.  He does wish to proceed forward with an in office cystoscopy today.      Past Medical History:  History reviewed. No pertinent past medical history.    Current Meds:  Current Outpatient Medications   Medication Sig Dispense Refill    tamsulosin (FLOMAX) 0.4 MG capsule 24 hr capsule Take 1 capsule by mouth Daily. 30 capsule 0    nitrofurantoin, macrocrystal-monohydrate, (Macrobid) 100 MG capsule Take 1 capsule by mouth Every Night for 30 days. 30 capsule 0     No current facility-administered medications for this visit.        Allergies:   No Known Allergies     Past Surgical History:  History reviewed. No pertinent surgical history.    Social History:  Social History     Socioeconomic History    Marital status: Single   Tobacco Use    Smoking status: Never    Smokeless tobacco: Never   Vaping Use    Vaping status: Never Used   Substance and Sexual Activity    Alcohol use: No    Drug use: No    Sexual activity: Defer       Family History:  Family History   Problem Relation Age of Onset    Diabetes Mother     Hypertension Mother     Diabetes Maternal " Grandfather     Hypertension Maternal Grandfather        Review of Systems:  Review of Systems   Constitutional:  Negative for fatigue, fever and unexpected weight change.   Respiratory:  Negative for chest tightness and shortness of breath.    Cardiovascular:  Negative for chest pain.   Gastrointestinal:  Negative for abdominal pain, constipation, diarrhea, nausea and vomiting.   Genitourinary:  Positive for difficulty urinating, dysuria, frequency, hematuria and urgency. Negative for flank pain, penile pain, penile swelling, scrotal swelling and testicular pain.   Skin:  Negative for rash.   Psychiatric/Behavioral:  Negative for confusion and suicidal ideas.        Physical Exam:  Physical Exam  Constitutional:       General: He is not in acute distress.     Appearance: Normal appearance.   HENT:      Head: Normocephalic and atraumatic.      Nose: Nose normal.      Mouth/Throat:      Mouth: Mucous membranes are moist.   Eyes:      Conjunctiva/sclera: Conjunctivae normal.   Cardiovascular:      Rate and Rhythm: Normal rate and regular rhythm.      Pulses: Normal pulses.      Heart sounds: Normal heart sounds.   Pulmonary:      Effort: Pulmonary effort is normal.      Breath sounds: Normal breath sounds.   Abdominal:      General: Bowel sounds are normal.      Palpations: Abdomen is soft.   Musculoskeletal:         General: Normal range of motion.      Cervical back: Normal range of motion.   Skin:     General: Skin is warm.   Neurological:      General: No focal deficit present.      Mental Status: He is alert and oriented to person, place, and time.   Psychiatric:         Mood and Affect: Mood normal.         Behavior: Behavior normal.         Thought Content: Thought content normal.         Judgment: Judgment normal.           Recent Image (CT and/or KUB):   CT Abdomen and Pelvis: Results for orders placed during the hospital encounter of 04/14/24    CT Abdomen Pelvis With & Without Contrast    Narrative  EXAM:  CT ABDOMEN PELVIS W WO CONTRAST-      TECHNIQUE: Multiple axial CT images were obtained from lung bases  through pubic symphysis WITHOUT AND THEN AFTER THE administration of IV  contrast. Reformatted images in the coronal and/or sagittal plane(s)  were generated from the axial data set to facilitate diagnostic accuracy  and/or surgical planning.  Oral Contrast:NONE.    Radiation dose reduction techniques were utilized per ALARA protocol.  Automated exposure control was initiated through either or [employer] or  DoseRight software packages by  protocol.  DOSE:    Clinical information acute hematuria    Comparison 3/28/2021    FINDINGS:    Lower thorax: Clear. No effusions.    Abdomen:    Liver: Homogeneous. No focal hepatic mass or ductal dilatation.    Gallbladder: No dilation or stone identified.    Pancreas: Unremarkable. No mass or ductal dilatation.    Spleen: Homogeneous. No splenomegaly.    Adrenals: No mass.    Kidneys/ureters: No mass. No obstructive uropathy.  No evidence of  urolithiasis.    GI tract: Moderate to large stool burden. There is no evidence of  appendicitis    MESENTERY: Stranding around the urinary bladder    Vasculature: No evidence of aneurysm.    Abdominal wall: No focal hernia or mass.      Bladder: Urinary bladder wall is thickened. There is air in the urinary  bladder and perivesicular stranding.    Reproductive: Unremarkable as visualized    Bones: No acute bony abnormality.    Impression  1. Abnormal thickening of the urinary bladder wall. Air within the  urinary bladder. Stranding around the urinary bladder.    2. Other findings as above              r    This report was finalized on 4/14/2024 11:05 AM by Dr. Ahmet Tao MD.     CT Stone Protocol: No results found for this or any previous visit.     KUB: No results found for this or any previous visit.       Labs:  Brief Urine Lab Results  (Last result in the past 365 days)        Color   Clarity   Blood   Leuk Est    Nitrite   Protein   CREAT   Urine HCG        04/22/24 0902 Yellow   Clear   3+   Moderate (2+)   Negative   2+                 Office Visit on 04/22/2024   Component Date Value Ref Range Status    Color 04/22/2024 Yellow  Yellow, Straw, Dark Yellow, Sharon Final    Clarity, UA 04/22/2024 Clear  Clear Final    Specific Gravity  04/22/2024 1.030  1.005 - 1.030 Final    pH, Urine 04/22/2024 5.0  5.0 - 8.0 Final    Leukocytes 04/22/2024 Moderate (2+) (A)  Negative Final    Nitrite, UA 04/22/2024 Negative  Negative Final    Protein, POC 04/22/2024 2+ (A)  Negative mg/dL Final    Glucose, UA 04/22/2024 Negative  Negative mg/dL Final    Ketones, UA 04/22/2024 Negative  Negative Final    Urobilinogen, UA 04/22/2024 Normal  Normal, 0.2 E.U./dL Final    Bilirubin 04/22/2024 Negative  Negative Final    Blood, UA 04/22/2024 3+ (A)  Negative Final    Lot Number 04/22/2024 [id number]   Final    Expiration Date 04/22/2024 10/25/2024   Final    Urine Culture 04/22/2024 No growth   Final   Admission on 04/14/2024, Discharged on 04/14/2024   Component Date Value Ref Range Status    Color, UA 04/14/2024 Yellow  Yellow, Straw Final    Appearance, UA 04/14/2024 Cloudy (A)  Clear Final    pH, UA 04/14/2024 6.0  5.0 - 8.0 Final    Specific Gravity, UA 04/14/2024 1.023  1.005 - 1.030 Final    Glucose, UA 04/14/2024 Negative  Negative Final    Ketones, UA 04/14/2024 Negative  Negative Final    Bilirubin, UA 04/14/2024 Negative  Negative Final    Blood, UA 04/14/2024 Large (3+) (A)  Negative Final    Protein, UA 04/14/2024 >=300 mg/dL (3+) (A)  Negative Final    Leuk Esterase, UA 04/14/2024 Moderate (2+) (A)  Negative Final    Nitrite, UA 04/14/2024 Negative  Negative Final    Urobilinogen, UA 04/14/2024 1.0 E.U./dL  0.2 - 1.0 E.U./dL Final    Chlamydia DNA by PCR 04/14/2024 Not Detected  Not Detected Final    Neisseria gonorrhoeae by PCR 04/14/2024 Not Detected  Not Detected Final    Trichomonas vaginalis PCR 04/14/2024 Not Detected  Not  Detected, Invalid Final    Glucose 04/14/2024 105 (H)  65 - 99 mg/dL Final    BUN 04/14/2024 14  6 - 20 mg/dL Final    Creatinine 04/14/2024 0.94  0.76 - 1.27 mg/dL Final    Sodium 04/14/2024 141  136 - 145 mmol/L Final    Potassium 04/14/2024 4.7  3.5 - 5.2 mmol/L Final    Specimen hemolyzed.  Result may be falsely elevated.    Chloride 04/14/2024 106  98 - 107 mmol/L Final    CO2 04/14/2024 23.4  22.0 - 29.0 mmol/L Final    Calcium 04/14/2024 9.4  8.6 - 10.5 mg/dL Final    Total Protein 04/14/2024 7.4  6.0 - 8.5 g/dL Final    Albumin 04/14/2024 4.5  3.5 - 5.2 g/dL Final    ALT (SGPT) 04/14/2024 19  1 - 41 U/L Final    Specimen hemolyzed.  Result may  be falsely elevated.    AST (SGOT) 04/14/2024 23  1 - 40 U/L Final    Alkaline Phosphatase 04/14/2024 60  39 - 117 U/L Final    Total Bilirubin 04/14/2024 0.4  0.0 - 1.2 mg/dL Final    Globulin 04/14/2024 2.9  gm/dL Final    A/G Ratio 04/14/2024 1.6  g/dL Final    BUN/Creatinine Ratio 04/14/2024 14.9  7.0 - 25.0 Final    Anion Gap 04/14/2024 11.6  5.0 - 15.0 mmol/L Final    eGFR 04/14/2024 118.3  >60.0 mL/min/1.73 Final    Protime 04/14/2024 13.0  12.1 - 14.7 Seconds Final    INR 04/14/2024 0.93  0.90 - 1.10 Final    PTT 04/14/2024 27.3  26.5 - 34.5 seconds Final    C-Reactive Protein 04/14/2024 1.33 (H)  0.00 - 0.50 mg/dL Final    Magnesium 04/14/2024 2.0  1.6 - 2.6 mg/dL Final    WBC 04/14/2024 11.92 (H)  3.40 - 10.80 10*3/mm3 Final    RBC 04/14/2024 5.11  4.14 - 5.80 10*6/mm3 Final    Hemoglobin 04/14/2024 14.4  13.0 - 17.7 g/dL Final    Hematocrit 04/14/2024 44.0  37.5 - 51.0 % Final    MCV 04/14/2024 86.1  79.0 - 97.0 fL Final    MCH 04/14/2024 28.2  26.6 - 33.0 pg Final    MCHC 04/14/2024 32.7  31.5 - 35.7 g/dL Final    RDW 04/14/2024 13.2  12.3 - 15.4 % Final    RDW-SD 04/14/2024 41.2  37.0 - 54.0 fl Final    MPV 04/14/2024 10.4  6.0 - 12.0 fL Final    Platelets 04/14/2024 275  140 - 450 10*3/mm3 Final    Neutrophil % 04/14/2024 72.1  42.7 - 76.0 % Final     Lymphocyte % 04/14/2024 17.7 (L)  19.6 - 45.3 % Final    Monocyte % 04/14/2024 9.0  5.0 - 12.0 % Final    Eosinophil % 04/14/2024 0.6  0.3 - 6.2 % Final    Basophil % 04/14/2024 0.3  0.0 - 1.5 % Final    Immature Grans % 04/14/2024 0.3  0.0 - 0.5 % Final    Neutrophils, Absolute 04/14/2024 8.60 (H)  1.70 - 7.00 10*3/mm3 Final    Lymphocytes, Absolute 04/14/2024 2.11  0.70 - 3.10 10*3/mm3 Final    Monocytes, Absolute 04/14/2024 1.07 (H)  0.10 - 0.90 10*3/mm3 Final    Eosinophils, Absolute 04/14/2024 0.07  0.00 - 0.40 10*3/mm3 Final    Basophils, Absolute 04/14/2024 0.03  0.00 - 0.20 10*3/mm3 Final    Immature Grans, Absolute 04/14/2024 0.04  0.00 - 0.05 10*3/mm3 Final    nRBC 04/14/2024 0.0  0.0 - 0.2 /100 WBC Final    Extra Tube 04/14/2024 Hold for add-ons.   Final    Auto resulted.    Extra Tube 04/14/2024 hold for add-on   Final    Auto resulted    Extra Tube 04/14/2024 Hold for add-ons.   Final    Auto resulted.    Extra Tube 04/14/2024 Hold for add-ons.   Final    Auto resulted    RBC, UA 04/14/2024 Too Numerous to Count (A)  None Seen, 0-2 /HPF Final    WBC, UA 04/14/2024 Too Numerous to Count (A)  None Seen, 0-2 /HPF Final    Bacteria, UA 04/14/2024 None Seen  None Seen /HPF Final    Squamous Epithelial Cells, UA 04/14/2024 None Seen  None Seen, 0-2 /HPF Final    Hyaline Casts, UA 04/14/2024 None Seen  None Seen /LPF Final    Methodology 04/14/2024 Automated Microscopy   Final    Urine Culture 04/14/2024 No growth   Final        Procedure: Patient presents today for cystourethroscopy.  I went ahead and obtained an informed consent including the risks of anesthesia, bleeding, infection, etc.  After prepping and draping in a sterile fashion in the low dorsal lithotomy position, the urethra was gently anesthetized with 10 mL of 2% viscous Xylocaine jelly.  After an appropriate period of topical anesthesia, I used the Olympus digital 14 [place] flexible cystoscope to examine the anterior urethra which showed  significant inflammation and erythema consistent with urethritis.  Prostate was unremarkable.  Is into the patient's bladder he had erythema and inflammation consistent with cystitis.  I was also some mild sloughing and yellow tissue noted at the dome and left lateral bladder wall.  The ureteral orifices were visualized and normal in position and configuration. There were no stones, tumors, or foreign bodies.  The blue light was enabled and and showed no obvious mucosal lesions. The patient was given 80 mg of gentamicin in an intramuscular fashion as prophylaxis for the cystoscopy and released from the clinic.   Procedures     Assessment/Plan:   Problem List Items Addressed This Visit       Cystitis with hematuria - Primary    Relevant Medications    gentamicin (GARAMYCIN) injection 80 mg (Completed)    nitrofurantoin, macrocrystal-monohydrate, (Macrobid) 100 MG capsule       Health Maintenance:   Health Maintenance Due   Topic Date Due    BMI FOLLOWUP  Never done    HPV VACCINES (1 - Male 3-dose series) Never done    HEPATITIS C SCREENING  Never done    ANNUAL PHYSICAL  Never done    TDAP/TD VACCINES (1 - Tdap) Never done    COVID-19 Vaccine (3 - 2023-24 season) 09/01/2023        Smoking Counseling: Never smoked or use smokeless tobacco    Urine Incontinence: Patient reports that he is not currently experiencing any symptoms of urinary incontinence.    Patient was given instructions and counseling regarding his condition or for health maintenance advice. Please see specific information pulled into the AVS if appropriate.    Patient Education:   Cystitis with hematuria -patient's in office cystoscopy today revealed significant cystitis and urethra-itis as well as possible lipoma within the bladder.  Given current scope in office today I did give the patient a shot 80 mg gentamicin for prophylaxis.  I will also start the patient on Macrobid 100 mg once nightly to help with inflammation and acute cystitis.  Will also  send the patient's urine off for cytology results and call once available.  I did discuss with the patient cytology is negative and continues to pass tissue as well as blood we will proceed forward with a cystoscopy under general anesthetic with biopsy.  Otherwise we will see the patient back pending cytology report.  He verbalized understanding.    Visit Diagnoses:    ICD-10-CM ICD-9-CM   1. Cystitis with hematuria  N30.91 595.9       Meds Ordered During Visit:  New Medications Ordered This Visit   Medications    gentamicin (GARAMYCIN) injection 80 mg    nitrofurantoin, macrocrystal-monohydrate, (Macrobid) 100 MG capsule     Sig: Take 1 capsule by mouth Every Night for 30 days.     Dispense:  30 capsule     Refill:  0       Follow Up Appointment: Pending cytology report  No follow-ups on file.      This document has been electronically signed by Bjorn Anguiano PA-C   April 25, 2024 13:49 EDT    Part of this note may be an electronic transcription/translation of spoken language to printed text using the Dragon Dictation System.

## 2024-04-25 ENCOUNTER — LAB (OUTPATIENT)
Dept: UROLOGY | Facility: CLINIC | Age: 19
End: 2024-04-25
Payer: COMMERCIAL

## 2024-04-25 DIAGNOSIS — N30.91 CYSTITIS WITH HEMATURIA: ICD-10-CM

## 2024-04-25 DIAGNOSIS — R35.0 FREQUENCY OF MICTURITION: Primary | ICD-10-CM

## 2024-04-25 RX ORDER — NITROFURANTOIN 25; 75 MG/1; MG/1
100 CAPSULE ORAL NIGHTLY
Qty: 30 CAPSULE | Refills: 0 | Status: SHIPPED | OUTPATIENT
Start: 2024-04-25 | End: 2024-05-25

## 2024-04-26 ENCOUNTER — TELEPHONE (OUTPATIENT)
Dept: UROLOGY | Facility: CLINIC | Age: 19
End: 2024-04-26
Payer: COMMERCIAL

## 2024-04-26 NOTE — TELEPHONE ENCOUNTER
The pt called and said that he had taken 1 Macrobid pill at midnight and said that he was having strenuous breathing like he has ran a marathon and that he was having a high heart rate and wanted to know if this medication was dangerous. I told him this was one of the most gentle antibiotic that we prescribe and this is not a side effect. I asked the pt what his hear rate was and he said he had not checked it, that it just felt as if it was high. I told him that was more than likely heart palpitations. I told him to stop the medications and see if the symptoms correct themselves and if so it was the meds. If not then see his PCP.

## 2024-04-29 LAB — REF LAB TEST METHOD: NORMAL

## 2024-04-30 ENCOUNTER — TELEPHONE (OUTPATIENT)
Dept: UROLOGY | Facility: CLINIC | Age: 19
End: 2024-04-30
Payer: COMMERCIAL

## 2024-04-30 DIAGNOSIS — N30.91 CYSTITIS WITH HEMATURIA: Primary | ICD-10-CM

## 2024-04-30 RX ORDER — TRIMETHOPRIM 100 MG/1
100 TABLET ORAL DAILY
Qty: 30 TABLET | Refills: 0 | Status: SHIPPED | OUTPATIENT
Start: 2024-04-30

## 2024-04-30 NOTE — TELEPHONE ENCOUNTER
Called patient about cytology results and advised him that they showed no concerns of malignant cells.  Was anti-inflammatory cells present though.  Patient did discuss concerns of reactions to Macrobid.  Will discontinue this and recommend to continue with a nightly trimethoprim to help with inflammation and cystitis.  Will have him keep his follow-up on the 28th.  Patient verbalized understanding.

## 2024-05-28 ENCOUNTER — TELEPHONE (OUTPATIENT)
Dept: UROLOGY | Facility: CLINIC | Age: 19
End: 2024-05-28
Payer: COMMERCIAL

## 2024-05-28 ENCOUNTER — OFFICE VISIT (OUTPATIENT)
Dept: UROLOGY | Facility: CLINIC | Age: 19
End: 2024-05-28
Payer: COMMERCIAL

## 2024-05-28 VITALS
DIASTOLIC BLOOD PRESSURE: 76 MMHG | BODY MASS INDEX: 40.5 KG/M2 | HEIGHT: 68 IN | HEART RATE: 63 BPM | SYSTOLIC BLOOD PRESSURE: 132 MMHG | WEIGHT: 267.2 LBS

## 2024-05-28 DIAGNOSIS — N30.91 CYSTITIS WITH HEMATURIA: Primary | ICD-10-CM

## 2024-05-28 PROCEDURE — 99213 OFFICE O/P EST LOW 20 MIN: CPT

## 2024-05-28 NOTE — PROGRESS NOTES
"Chief Complaint:    Chief Complaint   Patient presents with    Cystitis with hematuria     1 month follow up       Vital Signs:   /76 (BP Location: Left arm, Patient Position: Sitting, Cuff Size: Adult)   Pulse 63   Ht 172.7 cm (67.99\")   Wt 121 kg (267 lb 3.2 oz)   BMI 40.64 kg/m²   Body mass index is 40.64 kg/m².      HPI:  Elie Vazquez is a 19 y.o. male who presents today for follow up    History of Present Illness  Mr. Vazquez presents to the clinic for follow-up for cystitis with hematuria.  He was initially evaluated after an ER visit where he was having passage of blood clots and urethral tissues.  He had a CT scan abdomen pelvis completed at that time that was unremarkable other than some bladder wall thickening and air within the bladder.  He underwent an office cystoscopy with me that showed significant cystitis and inflammation throughout the bladder wall with some notable friable tissue.  No active bleeding was noted on cystoscopy.  Patient has had 2 negative urine cultures previously.  He has been on Flomax and trimethoprim 100 mg nightly for cystitis and lower urinary tract symptoms.  He reports that trimethoprim did improve symptoms at first but he still has continue with passage of \"bladder tissue\" intermittently.  He reports his last passage of bladder tissue was roughly 3 to 4 days ago.  He denies any gross hematuria.  He has had a negative cytology as well.  He was unable to urinate in office.      Past Medical History:  History reviewed. No pertinent past medical history.    Current Meds:  Current Outpatient Medications   Medication Sig Dispense Refill    trimethoprim (TRIMPEX) 100 MG tablet Take 1 tablet by mouth Daily. 30 tablet 0    tamsulosin (FLOMAX) 0.4 MG capsule 24 hr capsule Take 1 capsule by mouth Daily. (Patient not taking: Reported on 5/28/2024) 30 capsule 0     No current facility-administered medications for this visit.        Allergies:   No Known Allergies     Past Surgical " History:  History reviewed. No pertinent surgical history.    Social History:  Social History     Socioeconomic History    Marital status: Single   Tobacco Use    Smoking status: Never    Smokeless tobacco: Never   Vaping Use    Vaping status: Never Used   Substance and Sexual Activity    Alcohol use: No    Drug use: No    Sexual activity: Defer       Family History:  Family History   Problem Relation Age of Onset    Diabetes Mother     Hypertension Mother     Diabetes Maternal Grandfather     Hypertension Maternal Grandfather        Review of Systems:  Review of Systems   Constitutional:  Negative for fatigue, fever and unexpected weight change.   Respiratory:  Negative for chest tightness and shortness of breath.    Cardiovascular:  Negative for chest pain.   Gastrointestinal:  Negative for abdominal pain, constipation, diarrhea, nausea and vomiting.   Genitourinary:  Positive for difficulty urinating, dysuria, frequency, hematuria and urgency. Negative for flank pain, penile pain, penile swelling, scrotal swelling and testicular pain.   Skin:  Negative for rash.   Psychiatric/Behavioral:  Negative for confusion and suicidal ideas.        Physical Exam:  Physical Exam  Constitutional:       General: He is not in acute distress.     Appearance: Normal appearance.   HENT:      Head: Normocephalic and atraumatic.      Nose: Nose normal.      Mouth/Throat:      Mouth: Mucous membranes are moist.   Eyes:      Conjunctiva/sclera: Conjunctivae normal.   Cardiovascular:      Rate and Rhythm: Normal rate and regular rhythm.      Pulses: Normal pulses.      Heart sounds: Normal heart sounds.   Pulmonary:      Effort: Pulmonary effort is normal.      Breath sounds: Normal breath sounds.   Abdominal:      General: Bowel sounds are normal.      Palpations: Abdomen is soft.   Genitourinary:     Penis: Normal.       Testes: Normal.   Musculoskeletal:         General: Normal range of motion.      Cervical back: Normal range of  motion.   Skin:     General: Skin is warm.   Neurological:      General: No focal deficit present.      Mental Status: He is alert and oriented to person, place, and time.   Psychiatric:         Mood and Affect: Mood normal.         Behavior: Behavior normal.         Thought Content: Thought content normal.         Judgment: Judgment normal.           Recent Image (CT and/or KUB):   CT Abdomen and Pelvis: Results for orders placed during the hospital encounter of 04/14/24    CT Abdomen Pelvis With & Without Contrast    Narrative  EXAM: CT ABDOMEN PELVIS W WO CONTRAST-      TECHNIQUE: Multiple axial CT images were obtained from lung bases  through pubic symphysis WITHOUT AND THEN AFTER THE administration of IV  contrast. Reformatted images in the coronal and/or sagittal plane(s)  were generated from the axial data set to facilitate diagnostic accuracy  and/or surgical planning.  Oral Contrast:NONE.    Radiation dose reduction techniques were utilized per ALARA protocol.  Automated exposure control was initiated through either or Exacaster or  DoseRight software packages by  protocol.  DOSE:    Clinical information acute hematuria    Comparison 3/28/2021    FINDINGS:    Lower thorax: Clear. No effusions.    Abdomen:    Liver: Homogeneous. No focal hepatic mass or ductal dilatation.    Gallbladder: No dilation or stone identified.    Pancreas: Unremarkable. No mass or ductal dilatation.    Spleen: Homogeneous. No splenomegaly.    Adrenals: No mass.    Kidneys/ureters: No mass. No obstructive uropathy.  No evidence of  urolithiasis.    GI tract: Moderate to large stool burden. There is no evidence of  appendicitis    MESENTERY: Stranding around the urinary bladder    Vasculature: No evidence of aneurysm.    Abdominal wall: No focal hernia or mass.      Bladder: Urinary bladder wall is thickened. There is air in the urinary  bladder and perivesicular stranding.    Reproductive: Unremarkable as  visualized    Bones: No acute bony abnormality.    Impression  1. Abnormal thickening of the urinary bladder wall. Air within the  urinary bladder. Stranding around the urinary bladder.    2. Other findings as above              r    This report was finalized on 2024 11:05 AM by Dr. Ahmet Tao MD.     CT Stone Protocol: No results found for this or any previous visit.     KUB: No results found for this or any previous visit.       Labs:  Brief Urine Lab Results  (Last result in the past 365 days)        Color   Clarity   Blood   Leuk Est   Nitrite   Protein   CREAT   Urine HCG        24 0902 Yellow   Clear   3+   Moderate (2+)   Negative   2+                 Lab on 2024   Component Date Value Ref Range Status    Reference Lab Report 2024    Final                    Value:[facility]  [address] [place] [address]  Phone: [phone] or [phone]  Fax: [phone]  Keenan Woods M.D., Medical Director    PATIENT NAME                                 LABORATORY NO.  KALEIGH GROVER                                [id number]  [id number]                                     AGE                SEX     SSN            CLIENT REF #  [facility]                          19       2005   M       [id number]    [id number]  [address]                                 REQUESTING M.D.       ATTENDING M.[place]..        [place] [address]                               Drain Calera  DATE COLLECTED        DATE RECEIVED          DATE REPORTED  2024    DIAGNOSIS:  A.      URINE, VOIDED:  Negative for malignant cells.  B.      URINE, VOIDED:  Negative for malignant cells.    MICROSCOPIC DESCRIPTION:  A.     Scant benign urothelial cells are present                           within a background of acute  inflammatory cells.  B.     Scant benign urothelial cells are present within a  background of acute  inflammatory cells.    Professional interpretation rendered by Anaya Hilliard D.O. at [facility], [place], [address] [place] [address].    CLINICAL HISTORY:  Cystitis with hematuria    SPECIMENS SUBMITTED:  A.    URINE, VOIDED  B.    URINE, VOIDED    GROSS SPECIMEN DESCRIPTION:  A.     35 ccs clear yellow fluid with no visible sediment received in fixative.  B.     50 ccs cloudy dark yellow fluid with scant visible sediment received in  fixative.    CYTOTECHNOLOGIST:         PATRICIA LANDEROS (ASCP)                       REVIEWED, DIAGNOSED AND ELECTRONICALLY  SIGNED BY:    Anaya Hilliard D.O.  CPT CODES:  88112x2     Office Visit on 04/22/2024   Component Date Value Ref Range Status    Color 04/22/2024 Yellow  Yellow, Straw, Dark Yellow, Sharon Final    Clarity, UA 04/22/2024 Clear  Clear Final    Specific Gravity  04/22/2024 1.030  1.005 - 1.030 Final    pH, Urine 04/22/2024 5.0  5.0 - 8.0 Final    Leukocytes 04/22/2024 Moderate (2+) (A)  Negative Final    Nitrite, UA 04/22/2024 Negative  Negative Final    Protein, POC 04/22/2024 2+ (A)  Negative mg/dL Final    Glucose, UA 04/22/2024 Negative  Negative mg/dL Final    Ketones, UA 04/22/2024 Negative  Negative Final    Urobilinogen, UA 04/22/2024 Normal  Normal, 0.2 E.U./dL Final    Bilirubin 04/22/2024 Negative  Negative Final    Blood, UA 04/22/2024 3+ (A)  Negative Final    Lot Number 04/22/2024 [id number]   Final    Expiration Date 04/22/2024 10/25/2024   Final    Urine Culture 04/22/2024 No growth   Final   Admission on 04/14/2024, Discharged on 04/14/2024   Component Date Value Ref Range Status    Color, UA 04/14/2024 Yellow  Yellow, Straw Final    Appearance, UA 04/14/2024 Cloudy (A)  Clear Final    pH, UA 04/14/2024 6.0  5.0 - 8.0 Final    Specific Gravity, UA 04/14/2024 1.023  1.005 - 1.030 Final    Glucose, UA 04/14/2024 Negative  Negative Final    Ketones, UA 04/14/2024 Negative  Negative Final    Bilirubin, UA 04/14/2024  Negative  Negative Final    Blood, UA 04/14/2024 Large (3+) (A)  Negative Final    Protein, UA 04/14/2024 >=300 mg/dL (3+) (A)  Negative Final    Leuk Esterase, UA 04/14/2024 Moderate (2+) (A)  Negative Final    Nitrite, UA 04/14/2024 Negative  Negative Final    Urobilinogen, UA 04/14/2024 1.0 E.U./dL  0.2 - 1.0 E.U./dL Final    Chlamydia DNA by PCR 04/14/2024 Not Detected  Not Detected Final    Neisseria gonorrhoeae by PCR 04/14/2024 Not Detected  Not Detected Final    Trichomonas vaginalis PCR 04/14/2024 Not Detected  Not Detected, Invalid Final    Glucose 04/14/2024 105 (H)  65 - 99 mg/dL Final    BUN 04/14/2024 14  6 - 20 mg/dL Final    Creatinine 04/14/2024 0.94  0.76 - 1.27 mg/dL Final    Sodium 04/14/2024 141  136 - 145 mmol/L Final    Potassium 04/14/2024 4.7  3.5 - 5.2 mmol/L Final    Specimen hemolyzed.  Result may be falsely elevated.    Chloride 04/14/2024 106  98 - 107 mmol/L Final    CO2 04/14/2024 23.4  22.0 - 29.0 mmol/L Final    Calcium 04/14/2024 9.4  8.6 - 10.5 mg/dL Final    Total Protein 04/14/2024 7.4  6.0 - 8.5 g/dL Final    Albumin 04/14/2024 4.5  3.5 - 5.2 g/dL Final    ALT (SGPT) 04/14/2024 19  1 - 41 U/L Final    Specimen hemolyzed.  Result may  be falsely elevated.    AST (SGOT) 04/14/2024 23  1 - 40 U/L Final    Alkaline Phosphatase 04/14/2024 60  39 - 117 U/L Final    Total Bilirubin 04/14/2024 0.4  0.0 - 1.2 mg/dL Final    Globulin 04/14/2024 2.9  gm/dL Final    A/G Ratio 04/14/2024 1.6  g/dL Final    BUN/Creatinine Ratio 04/14/2024 14.9  7.0 - 25.0 Final    Anion Gap 04/14/2024 11.6  5.0 - 15.0 mmol/L Final    eGFR 04/14/2024 118.3  >60.0 mL/min/1.73 Final    Protime 04/14/2024 13.0  12.1 - 14.7 Seconds Final    INR 04/14/2024 0.93  0.90 - 1.10 Final    PTT 04/14/2024 27.3  26.5 - 34.5 seconds Final    C-Reactive Protein 04/14/2024 1.33 (H)  0.00 - 0.50 mg/dL Final    Magnesium 04/14/2024 2.0  1.6 - 2.6 mg/dL Final    WBC 04/14/2024 11.92 (H)  3.40 - 10.80 10*3/mm3 Final    RBC  04/14/2024 5.11  4.14 - 5.80 10*6/mm3 Final    Hemoglobin 04/14/2024 14.4  13.0 - 17.7 g/dL Final    Hematocrit 04/14/2024 44.0  37.5 - 51.0 % Final    MCV 04/14/2024 86.1  79.0 - 97.0 fL Final    MCH 04/14/2024 28.2  26.6 - 33.0 pg Final    MCHC 04/14/2024 32.7  31.5 - 35.7 g/dL Final    RDW 04/14/2024 13.2  12.3 - 15.4 % Final    RDW-SD 04/14/2024 41.2  37.0 - 54.0 fl Final    MPV 04/14/2024 10.4  6.0 - 12.0 fL Final    Platelets 04/14/2024 275  140 - 450 10*3/mm3 Final    Neutrophil % 04/14/2024 72.1  42.7 - 76.0 % Final    Lymphocyte % 04/14/2024 17.7 (L)  19.6 - 45.3 % Final    Monocyte % 04/14/2024 9.0  5.0 - 12.0 % Final    Eosinophil % 04/14/2024 0.6  0.3 - 6.2 % Final    Basophil % 04/14/2024 0.3  0.0 - 1.5 % Final    Immature Grans % 04/14/2024 0.3  0.0 - 0.5 % Final    Neutrophils, Absolute 04/14/2024 8.60 (H)  1.70 - 7.00 10*3/mm3 Final    Lymphocytes, Absolute 04/14/2024 2.11  0.70 - 3.10 10*3/mm3 Final    Monocytes, Absolute 04/14/2024 1.07 (H)  0.10 - 0.90 10*3/mm3 Final    Eosinophils, Absolute 04/14/2024 0.07  0.00 - 0.40 10*3/mm3 Final    Basophils, Absolute 04/14/2024 0.03  0.00 - 0.20 10*3/mm3 Final    Immature Grans, Absolute 04/14/2024 0.04  0.00 - 0.05 10*3/mm3 Final    nRBC 04/14/2024 0.0  0.0 - 0.2 /100 WBC Final    Extra Tube 04/14/2024 Hold for add-ons.   Final    Auto resulted.    Extra Tube 04/14/2024 hold for add-on   Final    Auto resulted    Extra Tube 04/14/2024 Hold for add-ons.   Final    Auto resulted.    Extra Tube 04/14/2024 Hold for add-ons.   Final    Auto resulted    RBC, UA 04/14/2024 Too Numerous to Count (A)  None Seen, 0-2 /HPF Final    WBC, UA 04/14/2024 Too Numerous to Count (A)  None Seen, 0-2 /HPF Final    Bacteria, UA 04/14/2024 None Seen  None Seen /HPF Final    Squamous Epithelial Cells, UA 04/14/2024 None Seen  None Seen, 0-2 /HPF Final    Hyaline Casts, UA 04/14/2024 None Seen  None Seen /LPF Final    Methodology 04/14/2024 Automated Microscopy   Final    Urine  Culture 04/14/2024 No growth   Final        Procedure: None  Procedures     I have reviewed and agree with the above PMH, PSH, FMH, social history, medications, allergies, and labs.     Assessment/Plan:   Problem List Items Addressed This Visit       Cystitis with hematuria - Primary    Relevant Orders    Case Request (Completed)       Health Maintenance:   Health Maintenance Due   Topic Date Due    BMI FOLLOWUP  Never done    HPV VACCINES (1 - Male 3-dose series) Never done    HEPATITIS C SCREENING  Never done    ANNUAL PHYSICAL  Never done    TDAP/TD VACCINES (1 - Tdap) Never done    COVID-19 Vaccine (3 - 2023-24 season) 09/01/2023        Smoking Counseling: Never smoked or use smokeless tobacco    Urine Incontinence: Patient reports that he is not currently experiencing any symptoms of urinary incontinence.    Patient was given instructions and counseling regarding his condition or for health maintenance advice. Please see specific information pulled into the AVS if appropriate.    Patient Education:   Cystitis with hematuria and air within bladder -given patient's continuation of symptoms and recommended undergo a cystoscopy in the OR setting by Dr. Gay with possible biopsy when indicated.  Did discuss with the patient is [facility] recent cytology results that does confirm no significant malignancy.  Did discuss pathophysiology and causes of these conditions which can include but not limited to interstitial cystitis, chronic cystitis, bladder fistula, bladder diverticula, bladder tumors, lipomas, or other urological abnormalities.  Will schedule the patient for a cystoscopy on 6/19/2024.  Vies him return to clinic sooner if needed.  He verbalized understanding.    Visit Diagnoses:    ICD-10-CM ICD-9-CM   1. Cystitis with hematuria  N30.91 595.9       Meds Ordered During Visit:  No orders of the defined types were placed in this encounter.      Follow Up Appointment: Cystoscopy on 6/19/2024  No follow-ups on  file.      This document has been electronically signed by Bjorn Anguiano PA-C   May 28, 2024 10:46 EDT    Part of this note may be an electronic transcription/translation of spoken language to printed text using the Dragon Dictation System.

## 2024-05-28 NOTE — H&P (VIEW-ONLY)
"Chief Complaint:    Chief Complaint   Patient presents with    Cystitis with hematuria     1 month follow up       Vital Signs:   /76 (BP Location: Left arm, Patient Position: Sitting, Cuff Size: Adult)   Pulse 63   Ht 172.7 cm (67.99\")   Wt 121 kg (267 lb 3.2 oz)   BMI 40.64 kg/m²   Body mass index is 40.64 kg/m².      HPI:  Elie Vazquez is a 19 y.o. male who presents today for follow up    History of Present Illness  Mr. Vazquez presents to the clinic for follow-up for cystitis with hematuria.  He was initially evaluated after an ER visit where he was having passage of blood clots and urethral tissues.  He had a CT scan abdomen pelvis completed at that time that was unremarkable other than some bladder wall thickening and air within the bladder.  He underwent an office cystoscopy with me that showed significant cystitis and inflammation throughout the bladder wall with some notable friable tissue.  No active bleeding was noted on cystoscopy.  Patient has had 2 negative urine cultures previously.  He has been on Flomax and trimethoprim 100 mg nightly for cystitis and lower urinary tract symptoms.  He reports that trimethoprim did improve symptoms at first but he still has continue with passage of \"bladder tissue\" intermittently.  He reports his last passage of bladder tissue was roughly 3 to 4 days ago.  He denies any gross hematuria.  He has had a negative cytology as well.  He was unable to urinate in office.      Past Medical History:  History reviewed. No pertinent past medical history.    Current Meds:  Current Outpatient Medications   Medication Sig Dispense Refill    trimethoprim (TRIMPEX) 100 MG tablet Take 1 tablet by mouth Daily. 30 tablet 0    tamsulosin (FLOMAX) 0.4 MG capsule 24 hr capsule Take 1 capsule by mouth Daily. (Patient not taking: Reported on 5/28/2024) 30 capsule 0     No current facility-administered medications for this visit.        Allergies:   No Known Allergies     Past Surgical " History:  History reviewed. No pertinent surgical history.    Social History:  Social History     Socioeconomic History    Marital status: Single   Tobacco Use    Smoking status: Never    Smokeless tobacco: Never   Vaping Use    Vaping status: Never Used   Substance and Sexual Activity    Alcohol use: No    Drug use: No    Sexual activity: Defer       Family History:  Family History   Problem Relation Age of Onset    Diabetes Mother     Hypertension Mother     Diabetes Maternal Grandfather     Hypertension Maternal Grandfather        Review of Systems:  Review of Systems   Constitutional:  Negative for fatigue, fever and unexpected weight change.   Respiratory:  Negative for chest tightness and shortness of breath.    Cardiovascular:  Negative for chest pain.   Gastrointestinal:  Negative for abdominal pain, constipation, diarrhea, nausea and vomiting.   Genitourinary:  Positive for difficulty urinating, dysuria, frequency, hematuria and urgency. Negative for flank pain, penile pain, penile swelling, scrotal swelling and testicular pain.   Skin:  Negative for rash.   Psychiatric/Behavioral:  Negative for confusion and suicidal ideas.        Physical Exam:  Physical Exam  Constitutional:       General: He is not in acute distress.     Appearance: Normal appearance.   HENT:      Head: Normocephalic and atraumatic.      Nose: Nose normal.      Mouth/Throat:      Mouth: Mucous membranes are moist.   Eyes:      Conjunctiva/sclera: Conjunctivae normal.   Cardiovascular:      Rate and Rhythm: Normal rate and regular rhythm.      Pulses: Normal pulses.      Heart sounds: Normal heart sounds.   Pulmonary:      Effort: Pulmonary effort is normal.      Breath sounds: Normal breath sounds.   Abdominal:      General: Bowel sounds are normal.      Palpations: Abdomen is soft.   Genitourinary:     Penis: Normal.       Testes: Normal.   Musculoskeletal:         General: Normal range of motion.      Cervical back: Normal range of  motion.   Skin:     General: Skin is warm.   Neurological:      General: No focal deficit present.      Mental Status: He is alert and oriented to person, place, and time.   Psychiatric:         Mood and Affect: Mood normal.         Behavior: Behavior normal.         Thought Content: Thought content normal.         Judgment: Judgment normal.           Recent Image (CT and/or KUB):   CT Abdomen and Pelvis: Results for orders placed during the hospital encounter of 04/14/24    CT Abdomen Pelvis With & Without Contrast    Narrative  EXAM: CT ABDOMEN PELVIS W WO CONTRAST-      TECHNIQUE: Multiple axial CT images were obtained from lung bases  through pubic symphysis WITHOUT AND THEN AFTER THE administration of IV  contrast. Reformatted images in the coronal and/or sagittal plane(s)  were generated from the axial data set to facilitate diagnostic accuracy  and/or surgical planning.  Oral Contrast:NONE.    Radiation dose reduction techniques were utilized per ALARA protocol.  Automated exposure control was initiated through either or Unocoin or  DoseRight software packages by  protocol.  DOSE:    Clinical information acute hematuria    Comparison 3/28/2021    FINDINGS:    Lower thorax: Clear. No effusions.    Abdomen:    Liver: Homogeneous. No focal hepatic mass or ductal dilatation.    Gallbladder: No dilation or stone identified.    Pancreas: Unremarkable. No mass or ductal dilatation.    Spleen: Homogeneous. No splenomegaly.    Adrenals: No mass.    Kidneys/ureters: No mass. No obstructive uropathy.  No evidence of  urolithiasis.    GI tract: Moderate to large stool burden. There is no evidence of  appendicitis    MESENTERY: Stranding around the urinary bladder    Vasculature: No evidence of aneurysm.    Abdominal wall: No focal hernia or mass.      Bladder: Urinary bladder wall is thickened. There is air in the urinary  bladder and perivesicular stranding.    Reproductive: Unremarkable as  visualized    Bones: No acute bony abnormality.    Impression  1. Abnormal thickening of the urinary bladder wall. Air within the  urinary bladder. Stranding around the urinary bladder.    2. Other findings as above              r    This report was finalized on 2024 11:05 AM by Dr. Ahmet Tao MD.     CT Stone Protocol: No results found for this or any previous visit.     KUB: No results found for this or any previous visit.       Labs:  Brief Urine Lab Results  (Last result in the past 365 days)        Color   Clarity   Blood   Leuk Est   Nitrite   Protein   CREAT   Urine HCG        24 0902 Yellow   Clear   3+   Moderate (2+)   Negative   2+                 Lab on 2024   Component Date Value Ref Range Status    Reference Lab Report 2024    Final                    Value:[facility]  [address] [place] [address]  Phone: [phone] or [phone]  Fax: [phone]  Keenan Woods M.D., Medical Director    PATIENT NAME                                 LABORATORY NO.  KALEIGH GROVER                                [id number]  [id number]                                     AGE                SEX     SSN            CLIENT REF #  [facility]                          19       2005   M       [id number]    [id number]  [address]                                 REQUESTING M.D.       ATTENDING M.D..        [place] [address]                               [place]  DATE COLLECTED        DATE RECEIVED          DATE REPORTED  2024    DIAGNOSIS:  A.      URINE, VOIDED:  Negative for malignant cells.  B.      URINE, VOIDED:  Negative for malignant cells.    MICROSCOPIC DESCRIPTION:  A.     Scant benign urothelial cells are present                           within a background of acute  inflammatory cells.  B.     Scant benign urothelial cells are present within a  background of acute  inflammatory cells.    Professional interpretation rendered by Anaya Hilliard D.O. at [facility] [employer], [address] [place] [address].    CLINICAL HISTORY:  Cystitis with hematuria    SPECIMENS SUBMITTED:  A.    URINE, VOIDED  B.    URINE, VOIDED    GROSS SPECIMEN DESCRIPTION:  A.     35 ccs clear yellow fluid with no visible sediment received in fixative.  B.     50 ccs cloudy dark yellow fluid with scant visible sediment received in  fixative.    CYTOTECHNOLOGIST:         PATRICIA LANDEROS (ASCP)                       REVIEWED, DIAGNOSED AND ELECTRONICALLY  SIGNED BY:    Anaya Hilliard D.O.  CPT CODES:  88112x2     Office Visit on 04/22/2024   Component Date Value Ref Range Status    Color 04/22/2024 Yellow  Yellow, Straw, Dark Yellow, Sharon Final    Clarity, UA 04/22/2024 Clear  Clear Final    Specific Gravity  04/22/2024 1.030  1.005 - 1.030 Final    pH, Urine 04/22/2024 5.0  5.0 - 8.0 Final    Leukocytes 04/22/2024 Moderate (2+) (A)  Negative Final    Nitrite, UA 04/22/2024 Negative  Negative Final    Protein, POC 04/22/2024 2+ (A)  Negative mg/dL Final    Glucose, UA 04/22/2024 Negative  Negative mg/dL Final    Ketones, UA 04/22/2024 Negative  Negative Final    Urobilinogen, UA 04/22/2024 Normal  Normal, 0.2 E.U./dL Final    Bilirubin 04/22/2024 Negative  Negative Final    Blood, UA 04/22/2024 3+ (A)  Negative Final    Lot Number 04/22/2024 [id number]   Final    Expiration Date 04/22/2024 10/25/2024   Final    Urine Culture 04/22/2024 No growth   Final   Admission on 04/14/2024, Discharged on 04/14/2024   Component Date Value Ref Range Status    Color, UA 04/14/2024 Yellow  Yellow, Straw Final    Appearance, UA 04/14/2024 Cloudy (A)  Clear Final    pH, UA 04/14/2024 6.0  5.0 - 8.0 Final    Specific Gravity, UA 04/14/2024 1.023  1.005 - 1.030 Final    Glucose, UA 04/14/2024 Negative  Negative Final    Ketones, UA 04/14/2024 Negative  Negative Final    Bilirubin, UA 04/14/2024  Negative  Negative Final    Blood, UA 04/14/2024 Large (3+) (A)  Negative Final    Protein, UA 04/14/2024 >=300 mg/dL (3+) (A)  Negative Final    Leuk Esterase, UA 04/14/2024 Moderate (2+) (A)  Negative Final    Nitrite, UA 04/14/2024 Negative  Negative Final    Urobilinogen, UA 04/14/2024 1.0 E.U./dL  0.2 - 1.0 E.U./dL Final    Chlamydia DNA by PCR 04/14/2024 Not Detected  Not Detected Final    Neisseria gonorrhoeae by PCR 04/14/2024 Not Detected  Not Detected Final    Trichomonas vaginalis PCR 04/14/2024 Not Detected  Not Detected, Invalid Final    Glucose 04/14/2024 105 (H)  65 - 99 mg/dL Final    BUN 04/14/2024 14  6 - 20 mg/dL Final    Creatinine 04/14/2024 0.94  0.76 - 1.27 mg/dL Final    Sodium 04/14/2024 141  136 - 145 mmol/L Final    Potassium 04/14/2024 4.7  3.5 - 5.2 mmol/L Final    Specimen hemolyzed.  Result may be falsely elevated.    Chloride 04/14/2024 106  98 - 107 mmol/L Final    CO2 04/14/2024 23.4  22.0 - 29.0 mmol/L Final    Calcium 04/14/2024 9.4  8.6 - 10.5 mg/dL Final    Total Protein 04/14/2024 7.4  6.0 - 8.5 g/dL Final    Albumin 04/14/2024 4.5  3.5 - 5.2 g/dL Final    ALT (SGPT) 04/14/2024 19  1 - 41 U/L Final    Specimen hemolyzed.  Result may  be falsely elevated.    AST (SGOT) 04/14/2024 23  1 - 40 U/L Final    Alkaline Phosphatase 04/14/2024 60  39 - 117 U/L Final    Total Bilirubin 04/14/2024 0.4  0.0 - 1.2 mg/dL Final    Globulin 04/14/2024 2.9  gm/dL Final    A/G Ratio 04/14/2024 1.6  g/dL Final    BUN/Creatinine Ratio 04/14/2024 14.9  7.0 - 25.0 Final    Anion Gap 04/14/2024 11.6  5.0 - 15.0 mmol/L Final    eGFR 04/14/2024 118.3  >60.0 mL/min/1.73 Final    Protime 04/14/2024 13.0  12.1 - 14.7 Seconds Final    INR 04/14/2024 0.93  0.90 - 1.10 Final    PTT 04/14/2024 27.3  26.5 - 34.5 seconds Final    C-Reactive Protein 04/14/2024 1.33 (H)  0.00 - 0.50 mg/dL Final    Magnesium 04/14/2024 2.0  1.6 - 2.6 mg/dL Final    WBC 04/14/2024 11.92 (H)  3.40 - 10.80 10*3/mm3 Final    RBC  04/14/2024 5.11  4.14 - 5.80 10*6/mm3 Final    Hemoglobin 04/14/2024 14.4  13.0 - 17.7 g/dL Final    Hematocrit 04/14/2024 44.0  37.5 - 51.0 % Final    MCV 04/14/2024 86.1  79.0 - 97.0 fL Final    MCH 04/14/2024 28.2  26.6 - 33.0 pg Final    MCHC 04/14/2024 32.7  31.5 - 35.7 g/dL Final    RDW 04/14/2024 13.2  12.3 - 15.4 % Final    RDW-SD 04/14/2024 41.2  37.0 - 54.0 fl Final    MPV 04/14/2024 10.4  6.0 - 12.0 fL Final    Platelets 04/14/2024 275  140 - 450 10*3/mm3 Final    Neutrophil % 04/14/2024 72.1  42.7 - 76.0 % Final    Lymphocyte % 04/14/2024 17.7 (L)  19.6 - 45.3 % Final    Monocyte % 04/14/2024 9.0  5.0 - 12.0 % Final    Eosinophil % 04/14/2024 0.6  0.3 - 6.2 % Final    Basophil % 04/14/2024 0.3  0.0 - 1.5 % Final    Immature Grans % 04/14/2024 0.3  0.0 - 0.5 % Final    Neutrophils, Absolute 04/14/2024 8.60 (H)  1.70 - 7.00 10*3/mm3 Final    Lymphocytes, Absolute 04/14/2024 2.11  0.70 - 3.10 10*3/mm3 Final    Monocytes, Absolute 04/14/2024 1.07 (H)  0.10 - 0.90 10*3/mm3 Final    Eosinophils, Absolute 04/14/2024 0.07  0.00 - 0.40 10*3/mm3 Final    Basophils, Absolute 04/14/2024 0.03  0.00 - 0.20 10*3/mm3 Final    Immature Grans, Absolute 04/14/2024 0.04  0.00 - 0.05 10*3/mm3 Final    nRBC 04/14/2024 0.0  0.0 - 0.2 /100 WBC Final    Extra Tube 04/14/2024 Hold for add-ons.   Final    Auto resulted.    Extra Tube 04/14/2024 hold for add-on   Final    Auto resulted    Extra Tube 04/14/2024 Hold for add-ons.   Final    Auto resulted.    Extra Tube 04/14/2024 Hold for add-ons.   Final    Auto resulted    RBC, UA 04/14/2024 Too Numerous to Count (A)  None Seen, 0-2 /HPF Final    WBC, UA 04/14/2024 Too Numerous to Count (A)  None Seen, 0-2 /HPF Final    Bacteria, UA 04/14/2024 None Seen  None Seen /HPF Final    Squamous Epithelial Cells, UA 04/14/2024 None Seen  None Seen, 0-2 /HPF Final    Hyaline Casts, UA 04/14/2024 None Seen  None Seen /LPF Final    Methodology 04/14/2024 Automated Microscopy   Final    Urine  Culture 04/14/2024 No growth   Final        Procedure: None  Procedures     I have reviewed and agree with the above PMH, PSH, FMH, social history, medications, allergies, and labs.     Assessment/Plan:   Problem List Items Addressed This Visit       Cystitis with hematuria - Primary    Relevant Orders    Case Request (Completed)       Health Maintenance:   Health Maintenance Due   Topic Date Due    BMI FOLLOWUP  Never done    HPV VACCINES (1 - Male 3-dose series) Never done    HEPATITIS C SCREENING  Never done    ANNUAL PHYSICAL  Never done    TDAP/TD VACCINES (1 - Tdap) Never done    COVID-19 Vaccine (3 - 2023-24 season) 09/01/2023        Smoking Counseling: Never smoked or use smokeless tobacco    Urine Incontinence: Patient reports that he is not currently experiencing any symptoms of urinary incontinence.    Patient was given instructions and counseling regarding his condition or for health maintenance advice. Please see specific information pulled into the AVS if appropriate.    Patient Education:   Cystitis with hematuria and air within bladder -given patient's continuation of symptoms and recommended undergo a cystoscopy in the OR setting by Dr. Gay with possible biopsy when indicated.  Did discuss with the patient is [facility] recent cytology results that does confirm no significant malignancy.  Did discuss pathophysiology and causes of these conditions which can include but not limited to interstitial cystitis, chronic cystitis, bladder fistula, bladder diverticula, bladder tumors, lipomas, or other urological abnormalities.  Will schedule the patient for a cystoscopy on 6/19/2024.  Vies him return to clinic sooner if needed.  He verbalized understanding.    Visit Diagnoses:    ICD-10-CM ICD-9-CM   1. Cystitis with hematuria  N30.91 595.9       Meds Ordered During Visit:  No orders of the defined types were placed in this encounter.      Follow Up Appointment: Cystoscopy on 6/19/2024  No follow-ups on  file.      This document has been electronically signed by Bjorn Anguiano PA-C   May 28, 2024 10:46 EDT    Part of this note may be an electronic transcription/translation of spoken language to printed text using the Dragon Dictation System.

## 2024-06-17 ENCOUNTER — ANESTHESIA EVENT (OUTPATIENT)
Dept: PERIOP | Facility: HOSPITAL | Age: 19
End: 2024-06-17
Payer: COMMERCIAL

## 2024-06-19 ENCOUNTER — APPOINTMENT (OUTPATIENT)
Dept: GENERAL RADIOLOGY | Facility: HOSPITAL | Age: 19
End: 2024-06-19
Payer: COMMERCIAL

## 2024-06-19 ENCOUNTER — ANESTHESIA (OUTPATIENT)
Dept: PERIOP | Facility: HOSPITAL | Age: 19
End: 2024-06-19
Payer: COMMERCIAL

## 2024-06-19 ENCOUNTER — HOSPITAL ENCOUNTER (OUTPATIENT)
Facility: HOSPITAL | Age: 19
Setting detail: HOSPITAL OUTPATIENT SURGERY
Discharge: HOME | End: 2024-06-19
Attending: UROLOGY | Admitting: UROLOGY
Payer: COMMERCIAL

## 2024-06-19 VITALS
WEIGHT: 263 LBS | SYSTOLIC BLOOD PRESSURE: 134 MMHG | HEART RATE: 78 BPM | HEIGHT: 68 IN | BODY MASS INDEX: 39.86 KG/M2 | DIASTOLIC BLOOD PRESSURE: 75 MMHG | TEMPERATURE: 98 F | RESPIRATION RATE: 18 BRPM | OXYGEN SATURATION: 96 %

## 2024-06-19 DIAGNOSIS — N30.91 CYSTITIS WITH HEMATURIA: Primary | ICD-10-CM

## 2024-06-19 PROCEDURE — 25010000002 ONDANSETRON PER 1 MG: Performed by: NURSE ANESTHETIST, CERTIFIED REGISTERED

## 2024-06-19 PROCEDURE — 25010000002 PROPOFOL 200 MG/20ML EMULSION: Performed by: NURSE ANESTHETIST, CERTIFIED REGISTERED

## 2024-06-19 PROCEDURE — 25810000003 LACTATED RINGERS PER 1000 ML: Performed by: ANESTHESIOLOGY

## 2024-06-19 PROCEDURE — 25010000002 MEPERIDINE PER 100 MG: Performed by: NURSE ANESTHETIST, CERTIFIED REGISTERED

## 2024-06-19 PROCEDURE — 25010000002 FENTANYL CITRATE (PF) 50 MCG/ML SOLUTION: Performed by: NURSE ANESTHETIST, CERTIFIED REGISTERED

## 2024-06-19 PROCEDURE — 25010000002 HYDROMORPHONE PER 4 MG: Performed by: NURSE ANESTHETIST, CERTIFIED REGISTERED

## 2024-06-19 PROCEDURE — 25810000003 LACTATED RINGERS PER 1000 ML: Performed by: NURSE ANESTHETIST, CERTIFIED REGISTERED

## 2024-06-19 PROCEDURE — 52000 CYSTOURETHROSCOPY: CPT | Performed by: UROLOGY

## 2024-06-19 PROCEDURE — 25010000002 GENTAMICIN PER 80 MG: Performed by: UROLOGY

## 2024-06-19 PROCEDURE — 25010000002 MIDAZOLAM PER 1 MG: Performed by: NURSE ANESTHETIST, CERTIFIED REGISTERED

## 2024-06-19 RX ORDER — SODIUM CHLORIDE, SODIUM LACTATE, POTASSIUM CHLORIDE, CALCIUM CHLORIDE 600; 310; 30; 20 MG/100ML; MG/100ML; MG/100ML; MG/100ML
100 INJECTION, SOLUTION INTRAVENOUS ONCE AS NEEDED
Status: DISCONTINUED | OUTPATIENT
Start: 2024-06-19 | End: 2024-06-19 | Stop reason: HOSPADM

## 2024-06-19 RX ORDER — MIDAZOLAM HYDROCHLORIDE 1 MG/ML
1 INJECTION INTRAMUSCULAR; INTRAVENOUS
Status: DISCONTINUED | OUTPATIENT
Start: 2024-06-19 | End: 2024-06-19 | Stop reason: HOSPADM

## 2024-06-19 RX ORDER — SODIUM CHLORIDE, SODIUM LACTATE, POTASSIUM CHLORIDE, CALCIUM CHLORIDE 600; 310; 30; 20 MG/100ML; MG/100ML; MG/100ML; MG/100ML
125 INJECTION, SOLUTION INTRAVENOUS ONCE
Status: COMPLETED | OUTPATIENT
Start: 2024-06-19 | End: 2024-06-19

## 2024-06-19 RX ORDER — SODIUM CHLORIDE, SODIUM LACTATE, POTASSIUM CHLORIDE, CALCIUM CHLORIDE 600; 310; 30; 20 MG/100ML; MG/100ML; MG/100ML; MG/100ML
INJECTION, SOLUTION INTRAVENOUS CONTINUOUS PRN
Status: DISCONTINUED | OUTPATIENT
Start: 2024-06-19 | End: 2024-06-19 | Stop reason: SURG

## 2024-06-19 RX ORDER — LIDOCAINE HYDROCHLORIDE 20 MG/ML
JELLY TOPICAL AS NEEDED
Status: DISCONTINUED | OUTPATIENT
Start: 2024-06-19 | End: 2024-06-19 | Stop reason: HOSPADM

## 2024-06-19 RX ORDER — MEPERIDINE HYDROCHLORIDE 25 MG/ML
12.5 INJECTION INTRAMUSCULAR; INTRAVENOUS; SUBCUTANEOUS
Status: COMPLETED | OUTPATIENT
Start: 2024-06-19 | End: 2024-06-19

## 2024-06-19 RX ORDER — OXYCODONE HYDROCHLORIDE AND ACETAMINOPHEN 5; 325 MG/1; MG/1
1 TABLET ORAL ONCE AS NEEDED
Status: COMPLETED | OUTPATIENT
Start: 2024-06-19 | End: 2024-06-19

## 2024-06-19 RX ORDER — FAMOTIDINE 10 MG/ML
INJECTION, SOLUTION INTRAVENOUS AS NEEDED
Status: DISCONTINUED | OUTPATIENT
Start: 2024-06-19 | End: 2024-06-19 | Stop reason: SURG

## 2024-06-19 RX ORDER — HYDROCODONE BITARTRATE AND ACETAMINOPHEN 7.5; 325 MG/1; MG/1
1-2 TABLET ORAL EVERY 6 HOURS PRN
Qty: 8 TABLET | Refills: 0 | Status: SHIPPED | OUTPATIENT
Start: 2024-06-19

## 2024-06-19 RX ORDER — HYDROMORPHONE HYDROCHLORIDE 2 MG/ML
INJECTION, SOLUTION INTRAMUSCULAR; INTRAVENOUS; SUBCUTANEOUS AS NEEDED
Status: DISCONTINUED | OUTPATIENT
Start: 2024-06-19 | End: 2024-06-19 | Stop reason: SURG

## 2024-06-19 RX ORDER — PROPOFOL 10 MG/ML
INJECTION, EMULSION INTRAVENOUS AS NEEDED
Status: DISCONTINUED | OUTPATIENT
Start: 2024-06-19 | End: 2024-06-19 | Stop reason: SURG

## 2024-06-19 RX ORDER — SODIUM CHLORIDE 0.9 % (FLUSH) 0.9 %
10 SYRINGE (ML) INJECTION AS NEEDED
Status: DISCONTINUED | OUTPATIENT
Start: 2024-06-19 | End: 2024-06-19 | Stop reason: HOSPADM

## 2024-06-19 RX ORDER — ONDANSETRON 2 MG/ML
INJECTION INTRAMUSCULAR; INTRAVENOUS AS NEEDED
Status: DISCONTINUED | OUTPATIENT
Start: 2024-06-19 | End: 2024-06-19 | Stop reason: SURG

## 2024-06-19 RX ORDER — KETOROLAC TROMETHAMINE 30 MG/ML
30 INJECTION, SOLUTION INTRAMUSCULAR; INTRAVENOUS EVERY 6 HOURS PRN
Status: DISCONTINUED | OUTPATIENT
Start: 2024-06-19 | End: 2024-06-19 | Stop reason: HOSPADM

## 2024-06-19 RX ORDER — FENTANYL CITRATE 50 UG/ML
INJECTION, SOLUTION INTRAMUSCULAR; INTRAVENOUS AS NEEDED
Status: DISCONTINUED | OUTPATIENT
Start: 2024-06-19 | End: 2024-06-19 | Stop reason: SURG

## 2024-06-19 RX ORDER — GENTAMICIN SULFATE 80 MG/100ML
80 INJECTION, SOLUTION INTRAVENOUS ONCE
Status: COMPLETED | OUTPATIENT
Start: 2024-06-19 | End: 2024-06-19

## 2024-06-19 RX ORDER — MIDAZOLAM HYDROCHLORIDE 1 MG/ML
INJECTION INTRAMUSCULAR; INTRAVENOUS AS NEEDED
Status: DISCONTINUED | OUTPATIENT
Start: 2024-06-19 | End: 2024-06-19 | Stop reason: SURG

## 2024-06-19 RX ORDER — ONDANSETRON 2 MG/ML
4 INJECTION INTRAMUSCULAR; INTRAVENOUS AS NEEDED
Status: DISCONTINUED | OUTPATIENT
Start: 2024-06-19 | End: 2024-06-19 | Stop reason: HOSPADM

## 2024-06-19 RX ORDER — IPRATROPIUM BROMIDE AND ALBUTEROL SULFATE 2.5; .5 MG/3ML; MG/3ML
3 SOLUTION RESPIRATORY (INHALATION) ONCE AS NEEDED
Status: DISCONTINUED | OUTPATIENT
Start: 2024-06-19 | End: 2024-06-19 | Stop reason: HOSPADM

## 2024-06-19 RX ORDER — LIDOCAINE HYDROCHLORIDE 20 MG/ML
INJECTION, SOLUTION EPIDURAL; INFILTRATION; INTRACAUDAL; PERINEURAL AS NEEDED
Status: DISCONTINUED | OUTPATIENT
Start: 2024-06-19 | End: 2024-06-19 | Stop reason: SURG

## 2024-06-19 RX ORDER — FENTANYL CITRATE 50 UG/ML
50 INJECTION, SOLUTION INTRAMUSCULAR; INTRAVENOUS
Status: DISCONTINUED | OUTPATIENT
Start: 2024-06-19 | End: 2024-06-19 | Stop reason: HOSPADM

## 2024-06-19 RX ORDER — SODIUM CHLORIDE 9 MG/ML
40 INJECTION, SOLUTION INTRAVENOUS AS NEEDED
Status: DISCONTINUED | OUTPATIENT
Start: 2024-06-19 | End: 2024-06-19 | Stop reason: HOSPADM

## 2024-06-19 RX ORDER — SODIUM CHLORIDE 0.9 % (FLUSH) 0.9 %
10 SYRINGE (ML) INJECTION EVERY 12 HOURS SCHEDULED
Status: DISCONTINUED | OUTPATIENT
Start: 2024-06-19 | End: 2024-06-19 | Stop reason: HOSPADM

## 2024-06-19 RX ADMIN — FENTANYL CITRATE 50 MCG: 50 INJECTION, SOLUTION INTRAMUSCULAR; INTRAVENOUS at 12:13

## 2024-06-19 RX ADMIN — GENTAMICIN SULFATE 80 MG: 80 INJECTION, SOLUTION INTRAVENOUS at 11:09

## 2024-06-19 RX ADMIN — FAMOTIDINE 20 MG: 10 INJECTION, SOLUTION INTRAVENOUS at 11:09

## 2024-06-19 RX ADMIN — SODIUM CHLORIDE, POTASSIUM CHLORIDE, SODIUM LACTATE AND CALCIUM CHLORIDE: 600; 310; 30; 20 INJECTION, SOLUTION INTRAVENOUS at 11:09

## 2024-06-19 RX ADMIN — PROPOFOL 200 MG: 10 INJECTION, EMULSION INTRAVENOUS at 11:12

## 2024-06-19 RX ADMIN — HYDROMORPHONE HYDROCHLORIDE 0.5 MG: 2 INJECTION, SOLUTION INTRAMUSCULAR; INTRAVENOUS; SUBCUTANEOUS at 11:22

## 2024-06-19 RX ADMIN — HYDROMORPHONE HYDROCHLORIDE 0.5 MG: 2 INJECTION, SOLUTION INTRAMUSCULAR; INTRAVENOUS; SUBCUTANEOUS at 11:17

## 2024-06-19 RX ADMIN — LIDOCAINE HYDROCHLORIDE 60 MG: 20 INJECTION, SOLUTION EPIDURAL; INFILTRATION; INTRACAUDAL; PERINEURAL at 11:09

## 2024-06-19 RX ADMIN — PROPOFOL 200 MG: 10 INJECTION, EMULSION INTRAVENOUS at 11:20

## 2024-06-19 RX ADMIN — FENTANYL CITRATE 50 MCG: 50 INJECTION, SOLUTION INTRAMUSCULAR; INTRAVENOUS at 12:43

## 2024-06-19 RX ADMIN — MEPERIDINE HYDROCHLORIDE 12.5 MG: 25 INJECTION INTRAMUSCULAR; INTRAVENOUS; SUBCUTANEOUS at 12:12

## 2024-06-19 RX ADMIN — FENTANYL CITRATE 100 MCG: 50 INJECTION INTRAMUSCULAR; INTRAVENOUS at 11:09

## 2024-06-19 RX ADMIN — MIDAZOLAM HYDROCHLORIDE 2 MG: 1 INJECTION, SOLUTION INTRAMUSCULAR; INTRAVENOUS at 11:09

## 2024-06-19 RX ADMIN — HYDROMORPHONE HYDROCHLORIDE 0.5 MG: 2 INJECTION, SOLUTION INTRAMUSCULAR; INTRAVENOUS; SUBCUTANEOUS at 11:20

## 2024-06-19 RX ADMIN — ONDANSETRON 4 MG: 2 INJECTION INTRAMUSCULAR; INTRAVENOUS at 12:14

## 2024-06-19 RX ADMIN — MEPERIDINE HYDROCHLORIDE 12.5 MG: 25 INJECTION INTRAMUSCULAR; INTRAVENOUS; SUBCUTANEOUS at 12:11

## 2024-06-19 RX ADMIN — SODIUM CHLORIDE, POTASSIUM CHLORIDE, SODIUM LACTATE AND CALCIUM CHLORIDE 125 ML/HR: 600; 310; 30; 20 INJECTION, SOLUTION INTRAVENOUS at 10:28

## 2024-06-19 RX ADMIN — OXYCODONE HYDROCHLORIDE AND ACETAMINOPHEN 1 TABLET: 5; 325 TABLET ORAL at 12:12

## 2024-06-19 RX ADMIN — HYDROMORPHONE HYDROCHLORIDE 0.5 MG: 2 INJECTION, SOLUTION INTRAMUSCULAR; INTRAVENOUS; SUBCUTANEOUS at 11:26

## 2024-06-19 RX ADMIN — ONDANSETRON 4 MG: 2 INJECTION INTRAMUSCULAR; INTRAVENOUS at 11:18

## 2024-06-19 NOTE — OP NOTE
CYSTOSCOPY  Procedure Note    Elie Vazquez  6/19/2024    Pre-op Diagnosis:   Cystitis with hematuria [N30.91]    Post-op Diagnosis:     Post-Op Diagnosis Codes:     * Cystitis with hematuria [N30.91]    Procedure/CPT® Codes:  19-year-old white male with recurrent gross painless hematuria difficult to the stream presents for cystoscopy after prepped and draped in sterile fashion he had a normal distal urethra he had obvious posterior urethral valves of irritated prostate bladder showed no stones tumors foreign bodies I will set him up for a resection of the valves where appropriate    Procedure(s):  CYSTOSCOPY    Surgeon(s):  Kb Gay MD    Anesthesia: see anesthesia record    Staff:   Circulator: Phuong Myers RN  Scrub Person: Hui Guerrero LPN; Candy Dasilva    Estimated Blood Loss: none  Urine Voided: * No values recorded between 6/19/2024 11:09 AM and 6/19/2024 11:31 AM *    Specimens:                None      Drains: None    Findings: Posterior urethral valves    Blood: N/A    Complications: None    Grafts and Implants: None    Kb Gay MD     Date: 6/19/2024  Time: 11:45 EDT

## 2024-06-19 NOTE — ANESTHESIA POSTPROCEDURE EVALUATION
Patient: Elie Vazquez    Procedure Summary       Date: 06/19/24 Room / Location:  [facility] OR 06 /  [facility]    Anesthesia Start: 1109 Anesthesia Stop: 1132    Procedure: CYSTOSCOPY (Urethra) Diagnosis:       Cystitis with hematuria      (Cystitis with hematuria [N30.91])    Surgeons: Kb Gay MD Provider: Didier Sol MD    Anesthesia Type: general ASA Status: 2            Anesthesia Type: general    Vitals  Vitals Value Taken Time   /91 06/19/24 1144   Temp 97.8 °F (36.6 °C) 06/19/24 1134   Pulse 129 06/19/24 1148   Resp 20 06/19/24 1144   SpO2 92 % 06/19/24 1148   Vitals shown include unfiled device data.        Post Anesthesia Care and Evaluation    Patient location during evaluation: bedside  Patient participation: complete - patient participated  Level of consciousness: awake and alert  Pain score: 1  Pain management: adequate    Airway patency: patent  Anesthetic complications: No anesthetic complications  PONV Status: none  Cardiovascular status: acceptable  Respiratory status: acceptable  Hydration status: acceptable

## 2024-06-19 NOTE — ANESTHESIA PREPROCEDURE EVALUATION
Anesthesia Evaluation     Patient summary reviewed and Nursing notes reviewed   no history of anesthetic complications:   NPO Solid Status: > 8 hours  NPO Liquid Status: > 8 hours           Airway   Mallampati: II  TM distance: >3 FB  Neck ROM: full  Dental - normal exam     Pulmonary - negative pulmonary ROS    breath sounds clear to auscultation  Cardiovascular   Exercise tolerance: good (4-7 METS)    Rhythm: regular  Rate: normal        Neuro/Psych- negative ROS  GI/Hepatic/Renal/Endo    (+) obesity    Musculoskeletal (-) negative ROS    Abdominal     Abdomen: soft.   Substance History - negative use     OB/GYN negative ob/gyn ROS         Other - negative ROS                   Anesthesia Plan    ASA 2     general     intravenous induction     Anesthetic plan, risks, benefits, and alternatives have been provided, discussed and informed consent has been obtained with: patient.  Pre-procedure education provided  Use of blood products discussed with  Consented to blood products.    Plan discussed with CRNA.    CODE STATUS:

## 2024-06-19 NOTE — ANESTHESIA PROCEDURE NOTES
Airway  Urgency: elective    Date/Time: 6/19/2024 11:12 AM  Airway not difficult    General Information and Staff    Patient location during procedure: OR  Anesthesiologist: Didier Sol MD  CRNA/CAA: Myesha Valero CRNA    Indications and Patient Condition  Indications for airway management: airway protection    Preoxygenated: yes  Mask difficulty assessment: 0 - not attempted    Final Airway Details  Final airway type: supraglottic airway      Successful airway: classic  Size 4     Number of attempts at approach: 1  Assessment: lips, teeth, and gum same as pre-op    Additional Comments  LMA placed with no trauma noted. Patient tolerated well. Good seal. Secured.

## 2024-06-25 ENCOUNTER — OFFICE VISIT (OUTPATIENT)
Dept: UROLOGY | Facility: CLINIC | Age: 19
End: 2024-06-25
Payer: COMMERCIAL

## 2024-06-25 VITALS
DIASTOLIC BLOOD PRESSURE: 76 MMHG | HEIGHT: 68 IN | HEART RATE: 75 BPM | BODY MASS INDEX: 39.86 KG/M2 | WEIGHT: 263 LBS | SYSTOLIC BLOOD PRESSURE: 110 MMHG

## 2024-06-25 DIAGNOSIS — N30.91 CYSTITIS WITH HEMATURIA: Primary | ICD-10-CM

## 2024-06-25 DIAGNOSIS — Q64.2 POSTERIOR URETHRAL VALVES (PUV): ICD-10-CM

## 2024-06-25 PROCEDURE — 99213 OFFICE O/P EST LOW 20 MIN: CPT | Performed by: UROLOGY

## 2024-06-25 RX ORDER — SULFAMETHOXAZOLE AND TRIMETHOPRIM 800; 160 MG/1; MG/1
1 TABLET ORAL 2 TIMES DAILY
Qty: 42 TABLET | Refills: 2 | Status: SHIPPED | OUTPATIENT
Start: 2024-06-25

## 2024-06-25 NOTE — PROGRESS NOTES
Chief Complaint:      Chief Complaint   Patient presents with    Post-op       HPI:   19 y.o. 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  His pain is better his bleeding is better.  I reviewed the films him and put him on a 21-day course of Septra I discussed intervention repeat dilation versus resection of the valves I told him if he would like a resection this is not done locally.  Overall I think he is doing well I will see him back in 2 months he is dramatically improved    Past Medical History:     Past Medical History:   Diagnosis Date    Hematuria        Current Meds:     Current Outpatient Medications   Medication Sig Dispense Refill    HYDROcodone-acetaminophen (NORCO) 7.5-325 MG per tablet Take 1-2 tablets by mouth Every 6 (Six) Hours As Needed for Moderate Pain. (Patient not taking: Reported on 6/25/2024) 8 tablet 0    tamsulosin (FLOMAX) 0.4 MG capsule 24 hr capsule Take 1 capsule by mouth Daily. (Patient not taking: Reported on 6/25/2024) 30 capsule 0    trimethoprim (TRIMPEX) 100 MG tablet Take 1 tablet by mouth Daily. (Patient not taking: Reported on 6/25/2024) 30 tablet 0     No current facility-administered medications for this visit.        Allergies:      No Known Allergies     Past Surgical History:     Past Surgical History:   Procedure Laterality Date    CYSTOSCOPY N/A 6/19/2024    Procedure: CYSTOSCOPY;  Surgeon: Kb Gay MD;  Location: [facility];  Service: Urology;  Laterality: N/A;       Social History:     Social History     Socioeconomic History    Marital status: Single   Tobacco Use    Smoking status: Never    Smokeless tobacco: Never   Vaping Use    Vaping status: Never Used   Substance and Sexual Activity    Alcohol use: No    Drug use: No    Sexual activity: Defer       Family History:     Family History   Problem Relation Age of Onset    Diabetes Mother     Hypertension Mother     Diabetes Maternal Grandfather     Hypertension  Maternal Grandfather        Review of Systems:     Review of Systems   Constitutional: Negative.    HENT: Negative.     Eyes: Negative.    Respiratory: Negative.     Cardiovascular: Negative.    Gastrointestinal: Negative.    Endocrine: Negative.    Musculoskeletal: Negative.    Allergic/Immunologic: Negative.    Neurological: Negative.    Hematological: Negative.    Psychiatric/Behavioral: Negative.         Physical Exam:     Physical Exam  Vitals and nursing note reviewed.   Constitutional:       Appearance: He is well-developed.   HENT:      Head: Normocephalic and atraumatic.   Eyes:      Conjunctiva/sclera: Conjunctivae normal.      Pupils: Pupils are equal, round, and reactive to light.   Cardiovascular:      Rate and Rhythm: Normal rate and regular rhythm.      Heart sounds: Normal heart sounds.   Pulmonary:      Effort: Pulmonary effort is normal.      Breath sounds: Normal breath sounds.   Abdominal:      General: Bowel sounds are normal.      Palpations: Abdomen is soft.   Musculoskeletal:         General: Normal range of motion.      Cervical back: Normal range of motion.   Skin:     General: Skin is warm and dry.   Neurological:      Mental Status: He is alert and oriented to person, place, and time.      Deep Tendon Reflexes: Reflexes are normal and symmetric.   Psychiatric:         Behavior: Behavior normal.         Thought Content: Thought content normal.         Judgment: Judgment normal.         I have reviewed the following portions of the patient's history: Allergies, current medications, past family history, past medical history, past social history, past surgical history, problem list, and ROS and confirm it is accurate.    Recent Image (CT and/or KUB):      CT Abdomen and Pelvis: Results for orders placed during the hospital encounter of 04/14/24    CT Abdomen Pelvis With & Without Contrast    Narrative  EXAM: CT ABDOMEN PELVIS W WO CONTRAST-      TECHNIQUE: Multiple axial CT images were  obtained from lung bases  through pubic symphysis WITHOUT AND THEN AFTER THE administration of IV  contrast. Reformatted images in the coronal and/or sagittal plane(s)  were generated from the axial data set to facilitate diagnostic accuracy  and/or surgical planning.  Oral Contrast:NONE.    Radiation dose reduction techniques were utilized per ALARA protocol.  Automated exposure control was initiated through either or [employer] or  DoseRight software packages by  protocol.  DOSE:    Clinical information acute hematuria    Comparison 3/28/2021    FINDINGS:    Lower thorax: Clear. No effusions.    Abdomen:    Liver: Homogeneous. No focal hepatic mass or ductal dilatation.    Gallbladder: No dilation or stone identified.    Pancreas: Unremarkable. No mass or ductal dilatation.    Spleen: Homogeneous. No splenomegaly.    Adrenals: No mass.    Kidneys/ureters: No mass. No obstructive uropathy.  No evidence of  urolithiasis.    GI tract: Moderate to large stool burden. There is no evidence of  appendicitis    MESENTERY: Stranding around the urinary bladder    Vasculature: No evidence of aneurysm.    Abdominal wall: No focal hernia or mass.      Bladder: Urinary bladder wall is thickened. There is air in the urinary  bladder and perivesicular stranding.    Reproductive: Unremarkable as visualized    Bones: No acute bony abnormality.    Impression  1. Abnormal thickening of the urinary bladder wall. Air within the  urinary bladder. Stranding around the urinary bladder.    2. Other findings as above              r    This report was finalized on 4/14/2024 11:05 AM by Dr. Ahmet Tao MD.       CT Stone Protocol: No results found for this or any previous visit.       KUB: No results found for this or any previous visit.       Labs (past 3 months):      Lab on 04/25/2024   Component Date Value Ref Range Status    Reference Lab Report 04/25/2024    Final                    Value:[facility]  [address] [place] [address]  Phone: [phone] or [phone]  Fax: [phone]  Keenan Woods M.D., Medical Director    PATIENT NAME                                 LABORATORY NO.  KALEIGH GROVER                                [id number]  [id number]                                     AGE                SEX     SSN            CLIENT REF #  [facility]                          19       2005          [id number]    [id number]  [address]                                 REQUESTING M.D.       ATTENDING M.D..        [place] [address]                               [place] PERRIN  DATE COLLECTED        DATE RECEIVED          DATE REPORTED  2024    DIAGNOSIS:  A.      URINE, VOIDED:  Negative for malignant cells.  B.      URINE, VOIDED:  Negative for malignant cells.    MICROSCOPIC DESCRIPTION:  A.     Scant benign urothelial cells are present                           within a background of acute  inflammatory cells.  B.     Scant benign urothelial cells are present within a background of acute  inflammatory cells.    Professional interpretation rendered by Anaya Hilliard D.O. at [employer], [address] [place] [address].    CLINICAL HISTORY:  Cystitis with hematuria    SPECIMENS SUBMITTED:  A.    URINE, VOIDED  B.    URINE, VOIDED    GROSS SPECIMEN DESCRIPTION:  A.     35 ccs clear yellow fluid with no visible sediment received in fixative.  B.     50 ccs cloudy dark yellow fluid with scant visible sediment received in  fixative.    CYTOTECHNOLOGIST:         PATRICIA LANDEROS (ASCP)                       REVIEWED, DIAGNOSED AND ELECTRONICALLY  SIGNED BY:    Anaya Hilliard D.O.  CPT CODES:  88112x2     Office Visit on 2024   Component Date Value Ref Range Status    Color 2024 Yellow  Yellow, Straw, Dark Yellow, Sharon Final    Clarity, UA 2024 Clear  Clear Final     Specific Gravity  04/22/2024 1.030  1.005 - 1.030 Final    pH, Urine 04/22/2024 5.0  5.0 - 8.0 Final    Leukocytes 04/22/2024 Moderate (2+) (A)  Negative Final    Nitrite, UA 04/22/2024 Negative  Negative Final    Protein, POC 04/22/2024 2+ (A)  Negative mg/dL Final    Glucose, UA 04/22/2024 Negative  Negative mg/dL Final    Ketones, UA 04/22/2024 Negative  Negative Final    Urobilinogen, UA 04/22/2024 Normal  Normal, 0.2 E.U./dL Final    Bilirubin 04/22/2024 Negative  Negative Final    Blood, UA 04/22/2024 3+ (A)  Negative Final    Lot Number 04/22/2024 [id number]   Final    Expiration Date 04/22/2024 10/25/2024   Final    Urine Culture 04/22/2024 No growth   Final   Admission on 04/14/2024, Discharged on 04/14/2024   Component Date Value Ref Range Status    Color, UA 04/14/2024 Yellow  Yellow, Straw Final    Appearance, UA 04/14/2024 Cloudy (A)  Clear Final    pH, UA 04/14/2024 6.0  5.0 - 8.0 Final    Specific Gravity, UA 04/14/2024 1.023  1.005 - 1.030 Final    Glucose, UA 04/14/2024 Negative  Negative Final    Ketones, UA 04/14/2024 Negative  Negative Final    Bilirubin, UA 04/14/2024 Negative  Negative Final    Blood, UA 04/14/2024 Large (3+) (A)  Negative Final    Protein, UA 04/14/2024 >=300 mg/dL (3+) (A)  Negative Final    Leuk Esterase, UA 04/14/2024 Moderate (2+) (A)  Negative Final    Nitrite, UA 04/14/2024 Negative  Negative Final    Urobilinogen, UA 04/14/2024 1.0 E.U./dL  0.2 - 1.0 E.U./dL Final    Chlamydia DNA by PCR 04/14/2024 Not Detected  Not Detected Final    Neisseria gonorrhoeae by PCR 04/14/2024 Not Detected  Not Detected Final    Trichomonas vaginalis PCR 04/14/2024 Not Detected  Not Detected, Invalid Final    Glucose 04/14/2024 105 (H)  65 - 99 mg/dL Final    BUN 04/14/2024 14  6 - 20 mg/dL Final    Creatinine 04/14/2024 0.94  0.76 - 1.27 mg/dL Final    Sodium 04/14/2024 141  136 - 145 mmol/L Final    Potassium 04/14/2024 4.7  3.5 - 5.2 mmol/L Final    Specimen hemolyzed.  Result may be  falsely elevated.    Chloride 04/14/2024 106  98 - 107 mmol/L Final    CO2 04/14/2024 23.4  22.0 - 29.0 mmol/L Final    Calcium 04/14/2024 9.4  8.6 - 10.5 mg/dL Final    Total Protein 04/14/2024 7.4  6.0 - 8.5 g/dL Final    Albumin 04/14/2024 4.5  3.5 - 5.2 g/dL Final    ALT (SGPT) 04/14/2024 19  1 - 41 U/L Final    Specimen hemolyzed.  Result may  be falsely elevated.    AST (SGOT) 04/14/2024 23  1 - 40 U/L Final    Alkaline Phosphatase 04/14/2024 60  39 - 117 U/L Final    Total Bilirubin 04/14/2024 0.4  0.0 - 1.2 mg/dL Final    Globulin 04/14/2024 2.9  gm/dL Final    A/G Ratio 04/14/2024 1.6  g/dL Final    BUN/Creatinine Ratio 04/14/2024 14.9  7.0 - 25.0 Final    Anion Gap 04/14/2024 11.6  5.0 - 15.0 mmol/L Final    eGFR 04/14/2024 118.3  >60.0 mL/min/1.73 Final    Protime 04/14/2024 13.0  12.1 - 14.7 Seconds Final    INR 04/14/2024 0.93  0.90 - 1.10 Final    PTT 04/14/2024 27.3  26.5 - 34.5 seconds Final    C-Reactive Protein 04/14/2024 1.33 (H)  0.00 - 0.50 mg/dL Final    Magnesium 04/14/2024 2.0  1.6 - 2.6 mg/dL Final    WBC 04/14/2024 11.92 (H)  3.40 - 10.80 10*3/mm3 Final    RBC 04/14/2024 5.11  4.14 - 5.80 10*6/mm3 Final    Hemoglobin 04/14/2024 14.4  13.0 - 17.7 g/dL Final    Hematocrit 04/14/2024 44.0  37.5 - 51.0 % Final    MCV 04/14/2024 86.1  79.0 - 97.0 fL Final    MCH 04/14/2024 28.2  26.6 - 33.0 pg Final    MCHC 04/14/2024 32.7  31.5 - 35.7 g/dL Final    RDW 04/14/2024 13.2  12.3 - 15.4 % Final    RDW-SD 04/14/2024 41.2  37.0 - 54.0 fl Final    MPV 04/14/2024 10.4  6.0 - 12.0 fL Final    Platelets 04/14/2024 275  140 - 450 10*3/mm3 Final    Neutrophil % 04/14/2024 72.1  42.7 - 76.0 % Final    Lymphocyte % 04/14/2024 17.7 (L)  19.6 - 45.3 % Final    Monocyte % 04/14/2024 9.0  5.0 - 12.0 % Final    Eosinophil % 04/14/2024 0.6  0.3 - 6.2 % Final    Basophil % 04/14/2024 0.3  0.0 - 1.5 % Final    Immature Grans % 04/14/2024 0.3  0.0 - 0.5 % Final    Neutrophils, Absolute 04/14/2024 8.60 (H)  1.70 - 7.00  10*3/mm3 Final    Lymphocytes, Absolute 04/14/2024 2.11  0.70 - 3.10 10*3/mm3 Final    Monocytes, Absolute 04/14/2024 1.07 (H)  0.10 - 0.90 10*3/mm3 Final    Eosinophils, Absolute 04/14/2024 0.07  0.00 - 0.40 10*3/mm3 Final    Basophils, Absolute 04/14/2024 0.03  0.00 - 0.20 10*3/mm3 Final    Immature Grans, Absolute 04/14/2024 0.04  0.00 - 0.05 10*3/mm3 Final    nRBC 04/14/2024 0.0  0.0 - 0.2 /100 WBC Final    Extra Tube 04/14/2024 Hold for add-ons.   Final    Auto resulted.    Extra Tube 04/14/2024 hold for add-on   Final    Auto resulted    Extra Tube 04/14/2024 Hold for add-ons.   Final    Auto resulted.    Extra Tube 04/14/2024 Hold for add-ons.   Final    Auto resulted    RBC, UA 04/14/2024 Too Numerous to Count (A)  None Seen, 0-2 /HPF Final    WBC, UA 04/14/2024 Too Numerous to Count (A)  None Seen, 0-2 /HPF Final    Bacteria, UA 04/14/2024 None Seen  None Seen /HPF Final    Squamous Epithelial Cells, UA 04/14/2024 None Seen  None Seen, 0-2 /HPF Final    Hyaline Casts, UA 04/14/2024 None Seen  None Seen /LPF Final    Methodology 04/14/2024 Automated Microscopy   Final    Urine Culture 04/14/2024 No growth   Final        Procedure:       Assessment/Plan:   Type I posterior urethral valves-partial with the cystoscopy dilation his symptoms are dramatically improved.  I will give him a course of Septra to make sure there is no problem with bacterial colonization of the are  As long as he is voiding well without significant dysfunction I would recommend observation          This document has been electronically signed by PRAVEEN HEBERT MD June 25, 2024 12:46 EDT    Dictated Utilizing Dragon Dictation: Part of this note may be an electronic transcription/translation of spoken language to printed text using the Dragon Dictation System.

## 2024-06-26 PROBLEM — Q64.2 POSTERIOR URETHRAL VALVES (PUV): Status: ACTIVE | Noted: 2024-06-26

## 2024-08-27 ENCOUNTER — OFFICE VISIT (OUTPATIENT)
Dept: UROLOGY | Facility: CLINIC | Age: 19
End: 2024-08-27
Payer: COMMERCIAL

## 2024-08-27 VITALS
DIASTOLIC BLOOD PRESSURE: 88 MMHG | HEIGHT: 68 IN | SYSTOLIC BLOOD PRESSURE: 133 MMHG | HEART RATE: 89 BPM | WEIGHT: 275.8 LBS | BODY MASS INDEX: 41.8 KG/M2

## 2024-08-27 DIAGNOSIS — Q64.2 POSTERIOR URETHRAL VALVES (PUV): Primary | ICD-10-CM

## 2024-08-27 PROCEDURE — 99213 OFFICE O/P EST LOW 20 MIN: CPT | Performed by: UROLOGY

## 2024-08-27 NOTE — PROGRESS NOTES
Chief Complaint:      Chief Complaint   Patient presents with    Cystitis with hematuria       HPI:   19 y.o. male returns today is about 45% better the other option would be a repeat anesthetic cystoscopy and dilation.  He is in to see me back in 3 months he has the valve syndrome    Past Medical History:     Past Medical History:   Diagnosis Date    Hematuria        Current Meds:     Current Outpatient Medications   Medication Sig Dispense Refill    HYDROcodone-acetaminophen (NORCO) 7.5-325 MG per tablet Take 1-2 tablets by mouth Every 6 (Six) Hours As Needed for Moderate Pain. (Patient not taking: Reported on 6/25/2024) 8 tablet 0    sulfamethoxazole-trimethoprim (Bactrim DS) 800-160 MG per tablet Take 1 tablet by mouth 2 (Two) Times a Day. (Patient not taking: Reported on 8/27/2024) 42 tablet 2    tamsulosin (FLOMAX) 0.4 MG capsule 24 hr capsule Take 1 capsule by mouth Daily. (Patient not taking: Reported on 6/25/2024) 30 capsule 0    trimethoprim (TRIMPEX) 100 MG tablet Take 1 tablet by mouth Daily. (Patient not taking: Reported on 6/25/2024) 30 tablet 0     No current facility-administered medications for this visit.        Allergies:      No Known Allergies     Past Surgical History:     Past Surgical History:   Procedure Laterality Date    CYSTOSCOPY N/A 6/19/2024    Procedure: CYSTOSCOPY;  Surgeon: Kb Gay MD;  Location: [facility];  Service: Urology;  Laterality: N/A;       Social History:     Social History     Socioeconomic History    Marital status: Single   Tobacco Use    Smoking status: Never    Smokeless tobacco: Never   Vaping Use    Vaping status: Never Used   Substance and Sexual Activity    Alcohol use: No    Drug use: No    Sexual activity: Defer       Family History:     Family History   Problem Relation Age of Onset    Diabetes Mother     Hypertension Mother     Diabetes Maternal Grandfather     Hypertension Maternal Grandfather        Review of Systems:     Review of Systems    Constitutional: Negative.    HENT: Negative.     Eyes: Negative.    Respiratory: Negative.     Cardiovascular: Negative.    Gastrointestinal: Negative.    Endocrine: Negative.    Musculoskeletal: Negative.    Allergic/Immunologic: Negative.    Neurological: Negative.    Hematological: Negative.    Psychiatric/Behavioral: Negative.         Physical Exam:     Physical Exam  Vitals and nursing note reviewed.   Constitutional:       Appearance: He is well-developed.   HENT:      Head: Normocephalic and atraumatic.   Eyes:      Conjunctiva/sclera: Conjunctivae normal.      Pupils: Pupils are equal, round, and reactive to light.   Cardiovascular:      Rate and Rhythm: Normal rate and regular rhythm.      Heart sounds: Normal heart sounds.   Pulmonary:      Effort: Pulmonary effort is normal.      Breath sounds: Normal breath sounds.   Abdominal:      General: Bowel sounds are normal.      Palpations: Abdomen is soft.   Musculoskeletal:         General: Normal range of motion.      Cervical back: Normal range of motion.   Skin:     General: Skin is warm and dry.   Neurological:      Mental Status: He is alert and oriented to person, place, and time.      Deep Tendon Reflexes: Reflexes are normal and symmetric.   Psychiatric:         Behavior: Behavior normal.         Thought Content: Thought content normal.         Judgment: Judgment normal.         I have reviewed the following portions of the patient's history: Allergies, current medications, past family history, past medical history, past social history, past surgical history, problem list, and ROS and confirm it is accurate.    Recent Image (CT and/or KUB):      CT Abdomen and Pelvis: Results for orders placed during the hospital encounter of 04/14/24    CT Abdomen Pelvis With & Without Contrast    Narrative  EXAM: CT ABDOMEN PELVIS W WO CONTRAST-      TECHNIQUE: Multiple axial CT images were obtained from lung bases  through pubic symphysis WITHOUT AND THEN AFTER  THE administration of IV  contrast. Reformatted images in the coronal and/or sagittal plane(s)  were generated from the axial data set to facilitate diagnostic accuracy  and/or surgical planning.  Oral Contrast:NONE.    Radiation dose reduction techniques were utilized per ALARA protocol.  Automated exposure control was initiated through either or [employer] or  DoseRight software packages by  protocol.  DOSE:    Clinical information acute hematuria    Comparison 3/28/2021    FINDINGS:    Lower thorax: Clear. No effusions.    Abdomen:    Liver: Homogeneous. No focal hepatic mass or ductal dilatation.    Gallbladder: No dilation or stone identified.    Pancreas: Unremarkable. No mass or ductal dilatation.    Spleen: Homogeneous. No splenomegaly.    Adrenals: No mass.    Kidneys/ureters: No mass. No obstructive uropathy.  No evidence of  urolithiasis.    GI tract: Moderate to large stool burden. There is no evidence of  appendicitis    MESENTERY: Stranding around the urinary bladder    Vasculature: No evidence of aneurysm.    Abdominal wall: No focal hernia or mass.      Bladder: Urinary bladder wall is thickened. There is air in the urinary  bladder and perivesicular stranding.    Reproductive: Unremarkable as visualized    Bones: No acute bony abnormality.    Impression  1. Abnormal thickening of the urinary bladder wall. Air within the  urinary bladder. Stranding around the urinary bladder.    2. Other findings as above              r    This report was finalized on 4/14/2024 11:05 AM by Dr. Ahmet Tao MD.       CT Stone Protocol: No results found for this or any previous visit.       KUB: No results found for this or any previous visit.       Labs (past 3 months):      No visits with results within 3 Month(s) from this visit.   Latest known visit with results is:   Lab on 04/25/2024   Component Date Value Ref Range Status    Reference Lab Report 04/25/2024    Final                    Value:[facility]  [address] [place] [address]  Phone: [phone] or [phone]  Fax: [phone]  Keenan Woods M.D., Medical Director    PATIENT NAME                                 LABORATORY NO.  KALEIGH GROVER                                [id number]  [id number]                                     AGE                SEX     SSN            CLIENT REF #  [facility]                          19       2005          [id number]    [id number]  [address]                                 REQUESTING M.D.       ATTENDING M.D..        [place] [address]                               [place]  DATE COLLECTED        DATE RECEIVED          DATE REPORTED  2024    DIAGNOSIS:  A.      URINE, VOIDED:  Negative for malignant cells.  B.      URINE, VOIDED:  Negative for malignant cells.    MICROSCOPIC DESCRIPTION:  A.     Scant benign urothelial cells are present                           within a background of acute  inflammatory cells.  B.     Scant benign urothelial cells are present within a background of acute  inflammatory cells.    Professional interpretation rendered by Anaya Hilliard D.O. at [employer], [address] [place] [address].    CLINICAL HISTORY:  Cystitis with hematuria    SPECIMENS SUBMITTED:  A.    URINE, VOIDED  B.    URINE, VOIDED    GROSS SPECIMEN DESCRIPTION:  A.     35 ccs clear yellow fluid with no visible sediment received in fixative.  B.     50 ccs cloudy dark yellow fluid with scant visible sediment received in  fixative.    CYTOTECHNOLOGIST:         PATRICIA LANDEROS (ASCP)                       REVIEWED, DIAGNOSED AND ELECTRONICALLY  SIGNED BY:    Anaya Hilliard D.O.  CPT CODES:  88112x2          Procedure:       Assessment/Plan:   Urethral obstruction secondary to valve variant doing better he is at least 50% better I would recommend observation neck step would be  referral for anesthetic cystoscopy and probable excision            This document has been electronically signed by PRAVEEN HEBERT MD August 27, 2024 08:33 EDT    Dictated Utilizing Dragon Dictation: Part of this note may be an electronic transcription/translation of spoken language to printed text using the Dragon Dictation System.

## 2024-12-03 ENCOUNTER — OFFICE VISIT (OUTPATIENT)
Dept: UROLOGY | Facility: CLINIC | Age: 19
End: 2024-12-03
Payer: COMMERCIAL

## 2024-12-03 VITALS
HEIGHT: 68 IN | DIASTOLIC BLOOD PRESSURE: 79 MMHG | BODY MASS INDEX: 43.04 KG/M2 | WEIGHT: 284 LBS | SYSTOLIC BLOOD PRESSURE: 116 MMHG

## 2024-12-03 DIAGNOSIS — N30.91 CYSTITIS WITH HEMATURIA: Primary | ICD-10-CM

## 2024-12-03 DIAGNOSIS — Q64.2 POSTERIOR URETHRAL VALVES (PUV): ICD-10-CM

## 2024-12-03 PROCEDURE — 99213 OFFICE O/P EST LOW 20 MIN: CPT | Performed by: UROLOGY

## 2024-12-03 NOTE — PROGRESS NOTES
Chief Complaint:      Chief Complaint   Patient presents with    Cystitis       HPI:   19 y.o. male turns today for follow-up cystitis he is completely asymptomatic doing well no complaints or problems.  Will see him back on an as-needed basis.  He reports no lower urinary tract symptomatology, particularly irritative symptoms such as frequency, urgency, dysuria, and obstructive symptomatology, particularly dribbling, hesitancy, and intermittency.    Past Medical History:     Past Medical History:   Diagnosis Date    Hematuria        Current Meds:     Current Outpatient Medications   Medication Sig Dispense Refill    HYDROcodone-acetaminophen (NORCO) 7.5-325 MG per tablet Take 1-2 tablets by mouth Every 6 (Six) Hours As Needed for Moderate Pain. 8 tablet 0    sulfamethoxazole-trimethoprim (Bactrim DS) 800-160 MG per tablet Take 1 tablet by mouth 2 (Two) Times a Day. 42 tablet 2    tamsulosin (FLOMAX) 0.4 MG capsule 24 hr capsule Take 1 capsule by mouth Daily. 30 capsule 0    trimethoprim (TRIMPEX) 100 MG tablet Take 1 tablet by mouth Daily. 30 tablet 0     No current facility-administered medications for this visit.        Allergies:      No Known Allergies     Past Surgical History:     Past Surgical History:   Procedure Laterality Date    CYSTOSCOPY N/A 6/19/2024    Procedure: CYSTOSCOPY;  Surgeon: Kb aGy MD;  Location: [facility];  Service: Urology;  Laterality: N/A;       Social History:     Social History     Socioeconomic History    Marital status: Single   Tobacco Use    Smoking status: Never    Smokeless tobacco: Never   Vaping Use    Vaping status: Never Used   Substance and Sexual Activity    Alcohol use: No    Drug use: No    Sexual activity: Defer       Family History:     Family History   Problem Relation Age of Onset    Diabetes Mother     Hypertension Mother     Diabetes Maternal Grandfather     Hypertension Maternal Grandfather        Review of Systems:     Review of Systems    Constitutional: Negative.    HENT: Negative.     Eyes: Negative.    Respiratory: Negative.     Cardiovascular: Negative.    Gastrointestinal: Negative.    Endocrine: Negative.    Musculoskeletal: Negative.    Allergic/Immunologic: Negative.    Neurological: Negative.    Hematological: Negative.    Psychiatric/Behavioral: Negative.         Physical Exam:     Physical Exam  Vitals and nursing note reviewed.   Constitutional:       Appearance: He is well-developed.   HENT:      Head: Normocephalic and atraumatic.   Eyes:      Conjunctiva/sclera: Conjunctivae normal.      Pupils: Pupils are equal, round, and reactive to light.   Cardiovascular:      Rate and Rhythm: Normal rate and regular rhythm.      Heart sounds: Normal heart sounds.   Pulmonary:      Effort: Pulmonary effort is normal.      Breath sounds: Normal breath sounds.   Abdominal:      General: Bowel sounds are normal.      Palpations: Abdomen is soft.   Musculoskeletal:         General: Normal range of motion.      Cervical back: Normal range of motion.   Skin:     General: Skin is warm and dry.   Neurological:      Mental Status: He is alert and oriented to person, place, and time.      Deep Tendon Reflexes: Reflexes are normal and symmetric.   Psychiatric:         Behavior: Behavior normal.         Thought Content: Thought content normal.         Judgment: Judgment normal.         I have reviewed the following portions of the patient's history: Allergies, current medications, past family history, past medical history, past social history, past surgical history, problem list, and ROS and confirm it is accurate.    Recent Image (CT and/or KUB):      CT Abdomen and Pelvis: Results for orders placed during the hospital encounter of 04/14/24    CT Abdomen Pelvis With & Without Contrast    Narrative  EXAM: CT ABDOMEN PELVIS W WO CONTRAST-      TECHNIQUE: Multiple axial CT images were obtained from lung bases  through pubic symphysis WITHOUT AND THEN AFTER  THE administration of IV  contrast. Reformatted images in the coronal and/or sagittal plane(s)  were generated from the axial data set to facilitate diagnostic accuracy  and/or surgical planning.  Oral Contrast:NONE.    Radiation dose reduction techniques were utilized per ALARA protocol.  Automated exposure control was initiated through either or [employer] or  DoseRight software packages by  protocol.  DOSE:    Clinical information acute hematuria    Comparison 3/28/2021    FINDINGS:    Lower thorax: Clear. No effusions.    Abdomen:    Liver: Homogeneous. No focal hepatic mass or ductal dilatation.    Gallbladder: No dilation or stone identified.    Pancreas: Unremarkable. No mass or ductal dilatation.    Spleen: Homogeneous. No splenomegaly.    Adrenals: No mass.    Kidneys/ureters: No mass. No obstructive uropathy.  No evidence of  urolithiasis.    GI tract: Moderate to large stool burden. There is no evidence of  appendicitis    MESENTERY: Stranding around the urinary bladder    Vasculature: No evidence of aneurysm.    Abdominal wall: No focal hernia or mass.      Bladder: Urinary bladder wall is thickened. There is air in the urinary  bladder and perivesicular stranding.    Reproductive: Unremarkable as visualized    Bones: No acute bony abnormality.    Impression  1. Abnormal thickening of the urinary bladder wall. Air within the  urinary bladder. Stranding around the urinary bladder.    2. Other findings as above              r    This report was finalized on 4/14/2024 11:05 AM by Dr. Ahmet Tao MD.       CT Stone Protocol: No results found for this or any previous visit.       KUB: No results found for this or any previous visit.       Labs (past 3 months):      No visits with results within 3 Month(s) from this visit.   Latest known visit with results is:   Lab on 04/25/2024   Component Date Value Ref Range Status    Reference Lab Report 04/25/2024    Final                    Value:[facility]  [address] [place] [address]  Phone: [phone] or [phone]  Fax: [phone]  Keenan Woods M.D., Medical Director    PATIENT NAME                                 LABORATORY NO.  KALEIGH GROVER                                [id number]  [id number]                                     AGE                SEX     SSN            CLIENT REF #  [facility]                          19       2005          [id number]    [id number]  [address]                                 REQUESTING M.D.       ATTENDING M.D..        [place] [address]                               [facility]IN  DATE COLLECTED        DATE RECEIVED          DATE REPORTED  2024    DIAGNOSIS:  A.      URINE, VOIDED:  Negative for malignant cells.  B.      URINE, VOIDED:  Negative for malignant cells.    MICROSCOPIC DESCRIPTION:  A.     Scant benign urothelial cells are present                           within a background of acute  inflammatory cells.  B.     Scant benign urothelial cells are present within a background of acute  inflammatory cells.    Professional interpretation rendered by Anaya Hilliard D.O. at [facility], [address] [place] [address].    CLINICAL HISTORY:  Cystitis with hematuria    SPECIMENS SUBMITTED:  A.    URINE, VOIDED  B.    URINE, VOIDED    GROSS SPECIMEN DESCRIPTION:  A.     35 ccs clear yellow fluid with no visible sediment received in fixative.  B.     50 ccs cloudy dark yellow fluid with scant visible sediment received in  fixative.    CYTOTECHNOLOGIST:         PATRICIA LANDEROS (ASCP)                       REVIEWED, DIAGNOSED AND ELECTRONICALLY  SIGNED BY:    Anaya Hilliard D.O.  CPT CODES:  88112x2          Procedure:       Assessment/Plan:   Recurrent urinary tract infections-patient has been referred and diagnosed with recurrent urinary tract infections.  We discussed the  types of organisms that are found in the urinary tract indicating that the vast majority are results of the patient's own gastrointestinal meri.  We discussed how many of the antibiotics that are utilized can actually exacerbate these infections by creating resistant organisms and there is only very few antibiotics that are concentrated in the urine and do not affect the rectal reservoir nor cause recurrent yeast vaginitis.  We discussed the risk factors for recurrent infections being intercourse in younger patients and atrophic changes in older patients.  We discussed the symptoms that are found including pain, pressure, burning, frequency, urgency, suprapubic pain, and painful intercourse.  I discussed upper tract symptoms including fevers and chills and indicated the workup would be much more aggressive if the patient were to present with recurrent infections in the face of upper tract symptomatology such as fever.  I discussed the history of vesicoureteral reflux in young patients and finally chronic renal scarring as a result of such.  I recommend concomitant probiotics with treatment with antibiotics to protect the rectal reservoir including over-the-counter yogurt preparations to joshua oral pills containing the appropriate probiotics.  He is symptomatically completely better          This document has been electronically signed by PRAVEEN HEBERT MD December 3, 2024 10:45 EST    Dictated Utilizing Dragon Dictation: Part of this note may be an electronic transcription/translation of spoken language to printed text using the Dragon Dictation System.

## (undated) DEVICE — GLV SURG TRIUMPH MICRO PF LTX 8 STRL

## (undated) DEVICE — COR CYSTO: Brand: MEDLINE INDUSTRIES, INC.